# Patient Record
Sex: FEMALE | Race: BLACK OR AFRICAN AMERICAN | NOT HISPANIC OR LATINO | Employment: FULL TIME | URBAN - METROPOLITAN AREA
[De-identification: names, ages, dates, MRNs, and addresses within clinical notes are randomized per-mention and may not be internally consistent; named-entity substitution may affect disease eponyms.]

---

## 2017-01-10 ENCOUNTER — LAB VISIT (OUTPATIENT)
Dept: LAB | Facility: HOSPITAL | Age: 52
End: 2017-01-10
Attending: INTERNAL MEDICINE
Payer: COMMERCIAL

## 2017-01-10 DIAGNOSIS — M35.9 CONNECTIVE TISSUE DISEASE, UNDIFFERENTIATED: ICD-10-CM

## 2017-01-10 LAB
ALBUMIN SERPL BCP-MCNC: 3.9 G/DL
ALP SERPL-CCNC: 86 U/L
ALT SERPL W/O P-5'-P-CCNC: 20 U/L
ANION GAP SERPL CALC-SCNC: 9 MMOL/L
AST SERPL-CCNC: 25 U/L
BASOPHILS # BLD AUTO: 0.02 K/UL
BASOPHILS NFR BLD: 0.4 %
BILIRUB SERPL-MCNC: 0.4 MG/DL
BUN SERPL-MCNC: 24 MG/DL
CALCIUM SERPL-MCNC: 9.1 MG/DL
CHLORIDE SERPL-SCNC: 108 MMOL/L
CO2 SERPL-SCNC: 22 MMOL/L
CREAT SERPL-MCNC: 0.8 MG/DL
DIFFERENTIAL METHOD: NORMAL
EOSINOPHIL # BLD AUTO: 0.2 K/UL
EOSINOPHIL NFR BLD: 4.7 %
ERYTHROCYTE [DISTWIDTH] IN BLOOD BY AUTOMATED COUNT: 13.2 %
ERYTHROCYTE [SEDIMENTATION RATE] IN BLOOD BY WESTERGREN METHOD: 6 MM/HR
EST. GFR  (AFRICAN AMERICAN): >60 ML/MIN/1.73 M^2
EST. GFR  (NON AFRICAN AMERICAN): >60 ML/MIN/1.73 M^2
GLUCOSE SERPL-MCNC: 93 MG/DL
HCT VFR BLD AUTO: 39.3 %
HGB BLD-MCNC: 13.9 G/DL
LYMPHOCYTES # BLD AUTO: 1.8 K/UL
LYMPHOCYTES NFR BLD: 38.5 %
MCH RBC QN AUTO: 28.9 PG
MCHC RBC AUTO-ENTMCNC: 35.4 %
MCV RBC AUTO: 82 FL
MONOCYTES # BLD AUTO: 0.5 K/UL
MONOCYTES NFR BLD: 10.7 %
NEUTROPHILS # BLD AUTO: 2.1 K/UL
NEUTROPHILS NFR BLD: 45.7 %
PLATELET # BLD AUTO: 169 K/UL
PMV BLD AUTO: 10 FL
POTASSIUM SERPL-SCNC: 4.9 MMOL/L
PROT SERPL-MCNC: 7.5 G/DL
RBC # BLD AUTO: 4.81 M/UL
SODIUM SERPL-SCNC: 139 MMOL/L
WBC # BLD AUTO: 4.67 K/UL

## 2017-01-10 PROCEDURE — 86140 C-REACTIVE PROTEIN: CPT

## 2017-01-10 PROCEDURE — 85025 COMPLETE CBC W/AUTO DIFF WBC: CPT | Mod: PO

## 2017-01-10 PROCEDURE — 80053 COMPREHEN METABOLIC PANEL: CPT | Mod: PO

## 2017-01-10 PROCEDURE — 85651 RBC SED RATE NONAUTOMATED: CPT | Mod: PO

## 2017-01-10 PROCEDURE — 36415 COLL VENOUS BLD VENIPUNCTURE: CPT | Mod: PO

## 2017-01-11 LAB — CRP SERPL-MCNC: 8.5 MG/L

## 2017-01-27 ENCOUNTER — OFFICE VISIT (OUTPATIENT)
Dept: RHEUMATOLOGY | Facility: CLINIC | Age: 52
End: 2017-01-27
Payer: COMMERCIAL

## 2017-01-27 VITALS
SYSTOLIC BLOOD PRESSURE: 133 MMHG | BODY MASS INDEX: 40.81 KG/M2 | WEIGHT: 260.56 LBS | HEART RATE: 61 BPM | DIASTOLIC BLOOD PRESSURE: 87 MMHG

## 2017-01-27 DIAGNOSIS — E55.9 VITAMIN D DEFICIENCY: ICD-10-CM

## 2017-01-27 DIAGNOSIS — I73.00 RAYNAUD'S DISEASE WITHOUT GANGRENE: ICD-10-CM

## 2017-01-27 DIAGNOSIS — D84.9 IMMUNOCOMPROMISED: ICD-10-CM

## 2017-01-27 DIAGNOSIS — M35.00 SJOGREN'S SYNDROME: ICD-10-CM

## 2017-01-27 DIAGNOSIS — Z51.81 MEDICATION MONITORING ENCOUNTER: ICD-10-CM

## 2017-01-27 DIAGNOSIS — E66.01 OBESITY, MORBID, BMI 40.0-49.9: Chronic | ICD-10-CM

## 2017-01-27 DIAGNOSIS — M35.9 CONNECTIVE TISSUE DISEASE, UNDIFFERENTIATED: Primary | ICD-10-CM

## 2017-01-27 PROCEDURE — 1159F MED LIST DOCD IN RCRD: CPT | Mod: S$GLB,,, | Performed by: PHYSICIAN ASSISTANT

## 2017-01-27 PROCEDURE — 3079F DIAST BP 80-89 MM HG: CPT | Mod: S$GLB,,, | Performed by: PHYSICIAN ASSISTANT

## 2017-01-27 PROCEDURE — 3075F SYST BP GE 130 - 139MM HG: CPT | Mod: S$GLB,,, | Performed by: PHYSICIAN ASSISTANT

## 2017-01-27 PROCEDURE — 99214 OFFICE O/P EST MOD 30 MIN: CPT | Mod: S$GLB,,, | Performed by: PHYSICIAN ASSISTANT

## 2017-01-27 PROCEDURE — 99999 PR PBB SHADOW E&M-EST. PATIENT-LVL IV: CPT | Mod: PBBFAC,,, | Performed by: PHYSICIAN ASSISTANT

## 2017-01-27 NOTE — MR AVS SNAPSHOT
[facility]  [address] [place] [address]  Phone: [phone]  Fax: [phone]                  Candie Gamboa   2017 8:00 AM   Office Visit    Description:  Female : 1965   Provider:  Jacqueline Nash PA-C   Department:  [facility] - Rheumatology           Reason for Visit     UCTD     Osteoarthritis           Diagnoses this Visit        Comments    Connective tissue disease, undifferentiated    -  Primary     Sjogren's syndrome         Raynaud's disease without gangrene         Vitamin D deficiency         Medication monitoring encounter         Immunocompromised                To Do List           Future Appointments        Provider Department Dept Phone    2017 8:00 AM Jacqueline Nash PA-C [facility] [phone]    2017 9:00 AM Mike Olmstead MD [facility] [phone]      Goals (5 Years of Data)     None      Follow-Up and Disposition     Return in about 3 months (around 2017) for me reg 4,Vit D,  urinalysis, dvvt, B2 glycoproteins, cardiolipin antibodies .      [facility] On Call     [facility] On Call Nurse Care Line -  Assistance  Registered nurses in the [facility] On Call Center provide clinical advisement, health education, appointment booking, and other advisory services.  Call for this free service at [phone].             Medications           Message regarding Medications     Verify the changes and/or additions to your medication regime listed below are the same as discussed with your clinician today.  If any of these changes or additions are incorrect, please notify your healthcare provider.             Verify that the below list of medications is an accurate representation of the medications you are currently taking.  If none reported, the list may be blank. If incorrect, please contact your healthcare provider. Carry this list with you in case of emergency.           Current Medications     ascorbic acid (VITAMIN C)  1000 MG tablet Take 1,000 mg by mouth 2 (two) times daily.    cholecalciferol, vitamin D3, 50,000 unit capsule Take 1 capsule (50,000 Units total) by mouth every 7 days.    diltiazem (CARDIZEM) 120 MG tablet Take 2 tablets (240 mg total) by mouth once daily.    DOCOSAHEXANOIC ACID/EPA (FISH OIL ORAL) Take 2,000 mg by mouth 2 (two) times daily.    esomeprazole (NEXIUM) 40 MG capsule Take 1 capsule (40 mg total) by mouth before breakfast.    fluticasone (FLONASE) 50 mcg/actuation nasal spray 1 spray by Each Nare route once daily.    hydroxychloroquine (PLAQUENIL) 200 mg tablet Take 1 tablet (200 mg total) by mouth 2 (two) times daily.    metoprolol succinate (TOPROL-XL) 100 MG 24 hr tablet Take 1 tablet (100 mg total) by mouth once daily.    multivitamin (ONE DAILY MULTIVITAMIN) per tablet Take 1 tablet by mouth once daily.    prasterone, dhea, (DHEA) 50 mg Cap Take 50 mg by mouth 2 (two) times daily.    pravastatin (PRAVACHOL) 40 MG tablet Take 40 mg by mouth once daily.    predniSONE (DELTASONE) 5 MG tablet Take 4 tablets (20 mg total) by mouth once daily. Or as directed    pyridoxine (VITAMIN B-6) 200 mg Tab Take by mouth.    tramadol (ULTRAM) 50 mg tablet Take 1 tablet (50 mg total) by mouth every 6 (six) hours as needed.    vitamin E 400 UNIT capsule Take 400 Units by mouth once daily.    zolpidem (AMBIEN) 5 MG Tab Take 1 tablet (5 mg total) by mouth nightly as needed.    coenzyme Q10 (CO Q-10) 100 mg capsule Take 100 mg by mouth once daily.           Clinical Reference Information           Vital Signs - Last Recorded  Most recent update: 1/27/2017  8:12 AM by Kalie Harp LPN    BP Pulse Wt BMI       133/87 61 118.2 kg (260 lb 9.3 oz) 40.81 kg/m2       Blood Pressure          Most Recent Value    BP  133/87      Allergies as of 1/27/2017     No Known Allergies      Immunizations Administered on Date of Encounter - 1/27/2017     None      Orders Placed During Today's Visit     Future Labs/Procedures  Expected by Expires    CT Chest Without Contrast  1/27/2017 1/27/2018    Recurring Lab Work Interval Expires    Urinalysis  Every 12 Weeks until 3/28/2018 3/28/2018    Vitamin D   3/28/2018      Instructions    Cont plaquenil 2 x daily    We will get a CT of your chest     Mediterranean [email or web address]

## 2017-01-27 NOTE — PROGRESS NOTES
Subjective:       Patient ID: Candie Gamboa is a 51 y.o. female.    Chief Complaint: UCTD and Osteoarthritis    HPI Comments: Candie is here for follow-up today.  She is seen for undifferentiated connective tissue disease associated with Raynaud's, arthralgias, and sjogrens.  She has a h/o leukopenia but today is normal..  She does have Sjogren's syndrome with dry eyes and dry mouth but her antibodies are negative.  She takes over-the-counter eyedrops.  She has chronic pain in her knees that is more related to degenerative joint disease and chondromalacia.  She has tried glucosamine chondroitin without great results but does take Aleve for her knee pain which helps.  Candie takes Plaquenil 200 mg twice a day for her UCTD tolerates this well no issues with her eyes she sees an eye doctor yearly.      She has no complaints of mouth sores, hair loss, chest pain, shortness of breath.  No abdominal complaints, no hematuria, no weakness.  She does have a rash on her bilateral forearms which was present last visit, livedo rash.  Labs were ordered to rule out anti-cardiolipin antibody syndrome but these were never done.  Her raynauds is stable.  She has chronic reflux and takes Nexium 40 mg.  She is on 50,000 units vitamin D weekly for vitamin D deficiency however last vitamin D level was done in 2014.  Echo and pulmonary function tests were ordered last visit I had the echo was normal showing normal right ventricle and pulmonary artery pressures.  Her pulmonary function tests showed normal airflow, but some decreased diffusion capacity due to reduced lung volume.     Review of Systems   Constitutional: Negative for chills, fatigue and fever.   HENT: Negative for mouth sores, rhinorrhea and sore throat.         Dry mouth  No mouth sores   Eyes: Negative for pain and redness.        Dry eyes   Respiratory: Negative for cough and shortness of breath.    Cardiovascular: Negative for chest pain.    Gastrointestinal: Negative for abdominal pain, constipation, diarrhea, nausea and vomiting.        Reflux   Genitourinary: Negative for dysuria and hematuria.   Musculoskeletal: Positive for arthralgias. Negative for joint swelling and myalgias.   Skin: Negative for rash.        raynauds   Neurological: Negative for weakness, numbness and headaches.   Psychiatric/Behavioral: The patient is not nervous/anxious.          Objective:     Visit Vitals    /87    Pulse 61    Wt 118.2 kg (260 lb 9.3 oz)    BMI 40.81 kg/m2        Physical Exam   Constitutional: She is oriented to person, place, and time and well-developed, well-nourished, and in no distress.   HENT:   Head: Normocephalic and atraumatic.   Eyes: Pupils are equal, round, and reactive to light. Right eye exhibits no discharge.   Neck: Normal range of motion.   Cardiovascular: Normal rate, regular rhythm and normal heart sounds.  Exam reveals no friction rub.    Pulmonary/Chest: Effort normal and breath sounds normal. No respiratory distress.   Abdominal: Soft. She exhibits no distension. There is no tenderness.   Lymphadenopathy:     She has no cervical adenopathy.   Neurological: She is alert and oriented to person, place, and time.   Skin: Rash noted. No erythema.     Livedo rash bilateral arms, forearms,   No telangiectasias, no digital uclers   Psychiatric: Mood normal.   Musculoskeletal: Normal range of motion. She exhibits no edema, tenderness or deformity.   pola wrists, mcps, pips full rom no swelling or tenderness, no sclerodactyly  pola knees pos crepitus, no effusions, no warmth  Muscle strength good pola upper and lower extremities              Recent Results (from the past 672 hour(s))   CBC auto differential    Collection Time: 01/10/17  9:33 AM   Result Value Ref Range    WBC 4.67 3.90 - 12.70 K/uL    RBC 4.81 4.00 - 5.40 M/uL    Hemoglobin 13.9 12.0 - 16.0 g/dL    Hematocrit 39.3 37.0 - 48.5 %    MCV 82 82 - 98 fL    MCH 28.9 27.0 -  31.0 pg    MCHC 35.4 32.0 - 36.0 %    RDW 13.2 11.5 - 14.5 %    Platelets 169 150 - 350 K/uL    MPV 10.0 9.2 - 12.9 fL    Gran # 2.1 1.8 - 7.7 K/uL    Lymph # 1.8 1.0 - 4.8 K/uL    Mono # 0.5 0.3 - 1.0 K/uL    Eos # 0.2 0.0 - 0.5 K/uL    Baso # 0.02 0.00 - 0.20 K/uL    Gran% 45.7 38.0 - 73.0 %    Lymph% 38.5 18.0 - 48.0 %    Mono% 10.7 4.0 - 15.0 %    Eosinophil% 4.7 0.0 - 8.0 %    Basophil% 0.4 0.0 - 1.9 %    Differential Method Automated    Comprehensive metabolic panel    Collection Time: 01/10/17  9:33 AM   Result Value Ref Range    Sodium 139 136 - 145 mmol/L    Potassium 4.9 3.5 - 5.1 mmol/L    Chloride 108 95 - 110 mmol/L    CO2 22 (L) 23 - 29 mmol/L    Glucose 93 70 - 110 mg/dL    BUN, Bld 24 (H) 6 - 20 mg/dL    Creatinine 0.8 0.5 - 1.4 mg/dL    Calcium 9.1 8.7 - 10.5 mg/dL    Total Protein 7.5 6.0 - 8.4 g/dL    Albumin 3.9 3.5 - 5.2 g/dL    Total Bilirubin 0.4 0.1 - 1.0 mg/dL    Alkaline Phosphatase 86 55 - 135 U/L    AST 25 10 - 40 U/L    ALT 20 10 - 44 U/L    Anion Gap 9 8 - 16 mmol/L    eGFR if African American >60 >60 mL/min/1.73 m^2    eGFR if non African American >60 >60 mL/min/1.73 m^2   Sedimentation rate, manual    Collection Time: 01/10/17  9:33 AM   Result Value Ref Range    Sed Rate 6 0 - 20 mm/Hr   C-reactive protein    Collection Time: 01/10/17  9:35 AM   Result Value Ref Range    CRP 8.5 (H) 0.0 - 8.2 mg/L         Echo 10/19/16:CONCLUSIONS     1 - Concentric hypertrophy.     2 - Normal left ventricular systolic function (EF 55-60%).     3 - Normal left ventricular diastolic function.     4 - Normal right ventricular systolic function .     5 - The estimated PA systolic pressure is 25 mmHg.     PFTs:   Airflow is normal. Plethysmographic lung volumes reveal moderate restriction as  evidenced by reduced residual volume. Diffusion capacity is mildly reduced but corrects  for alveolar volume. Reduction in DLCO can be explained solely by reduced lung  volume (DLCO reduced and DL/Va normal)           Assessment:       1. Connective tissue disease, undifferentiated    2. Sjogren's syndrome    3. Raynaud's disease without gangrene    4. Vitamin D deficiency    5. Medication monitoring encounter    6. Immunocompromised    7. Obesity, morbid, BMI 40.0-49.9          UCTD: +hammad (1:320 centromere), watch for limited scleroderma, pt with marked reflux, no pulm symptoms at this time, echo normal, PfT with decreased DLCO due to reduced lung volume  Sjogrens: stable, neg antibodies, otc drops  Raynauds: stable, taking vit [id number], fish oil daily  Vit D def: last level 30, done in 2014, on vit D 50,000units weekly  Med monitoring: no toxicity, sees eye drCortney Yearly  Immunocompromised; received flu at pcp  Livedo rash: unclear why, r/o anticardiolipin antibody syndrome, labs not done last time no h/o clots or miscarriages  Obesity: bmi 40+  Plan:        1. Due to reduced lung volume on PFT, will get HRCT lung to r/o ILD  2. Will get cardiolipin antibody, Dvvt, and B2 glycoproteins done for next visit since these weren't done  3. Check vit D nevt visit  4. Cont plaquenil 200mg BID  5. Encouraged weight loss, exercise, med. Diet    rtc in 3 mos with me, reg 4, urine, and above labs, CT review  rtc in 6 mos with dr. Olmstead    Call for questions/concerns    Reviewed, ANGEL OLMSTEAD MD

## 2017-01-27 NOTE — PATIENT INSTRUCTIONS
Cont plaquenil 2 x daily    We will get a CT of your chest     Mediterranean [email or web address]

## 2017-08-08 ENCOUNTER — TELEPHONE (OUTPATIENT)
Dept: RHEUMATOLOGY | Facility: CLINIC | Age: 52
End: 2017-08-08

## 2017-08-08 NOTE — TELEPHONE ENCOUNTER
----- Message from Charleen Kendrick sent at 8/8/2017  3:02 PM CDT -----  Contact: Pt  Pt is requesting to speak to the nurse regarding her appt that's scheduled on tomorrow. Pls call pt back at [phone].

## 2017-08-10 DIAGNOSIS — M35.9 CONNECTIVE TISSUE DISEASE, UNDIFFERENTIATED: ICD-10-CM

## 2017-08-10 RX ORDER — ASPIRIN 325 MG
50000 TABLET, DELAYED RELEASE (ENTERIC COATED) ORAL
Qty: 12 CAPSULE | Refills: 3 | Status: SHIPPED | OUTPATIENT
Start: 2017-08-10 | End: 2023-01-06

## 2017-08-10 NOTE — TELEPHONE ENCOUNTER
----- Message from Lexie Hallman sent at 8/9/2017  4:15 PM CDT -----  Would like to speak to nurse. Please call back at [phone]. thanks

## 2017-08-10 NOTE — TELEPHONE ENCOUNTER
----- Message from Mary Kay Whitfield sent at 8/10/2017 10:12 AM CDT -----  Contact: pt  The pt states she is returning a missed call, pt can be reached at [phone]///[id number]

## 2017-08-10 NOTE — TELEPHONE ENCOUNTER
Spoke with Ms. Gamboa and scheduled a follow up visit with Jacqueline Nash PA-C for 08/16/2017. Patient states the reason she could not be seen at previous scheduled date was due to her insurance. Patient is requesting a refill of her Vitamin D3 to be sent to [facility] in [place].

## 2017-08-16 ENCOUNTER — TELEPHONE (OUTPATIENT)
Dept: RHEUMATOLOGY | Facility: CLINIC | Age: 52
End: 2017-08-16

## 2017-08-16 NOTE — TELEPHONE ENCOUNTER
----- Message from Ban Senior sent at 8/15/2017  4:22 PM CDT -----  Contact: pt  States she would like to speak to Caio, she would like to speak with you. She wouldn't say what it was regarding. Please call pt at [phone]. Thank you

## 2017-10-10 DIAGNOSIS — M35.9 CONNECTIVE TISSUE DISEASE, UNDIFFERENTIATED: ICD-10-CM

## 2017-10-10 RX ORDER — HYDROXYCHLOROQUINE SULFATE 200 MG/1
TABLET, FILM COATED ORAL
Qty: 180 TABLET | Refills: 1 | Status: SHIPPED | OUTPATIENT
Start: 2017-10-10 | End: 2023-01-06

## 2017-10-10 RX ORDER — ESOMEPRAZOLE MAGNESIUM 40 MG/1
CAPSULE, DELAYED RELEASE ORAL
Qty: 90 CAPSULE | Refills: 1 | Status: SHIPPED | OUTPATIENT
Start: 2017-10-10 | End: 2018-03-22 | Stop reason: SDUPTHER

## 2018-03-22 DIAGNOSIS — M35.9 CONNECTIVE TISSUE DISEASE, UNDIFFERENTIATED: ICD-10-CM

## 2018-03-26 RX ORDER — ESOMEPRAZOLE MAGNESIUM 40 MG/1
CAPSULE, DELAYED RELEASE ORAL
Qty: 90 CAPSULE | Refills: 1 | Status: SHIPPED | OUTPATIENT
Start: 2018-03-26 | End: 2018-09-29 | Stop reason: SDUPTHER

## 2018-06-01 DIAGNOSIS — M35.9 CONNECTIVE TISSUE DISEASE, UNDIFFERENTIATED: ICD-10-CM

## 2018-06-08 RX ORDER — HYDROXYCHLOROQUINE SULFATE 200 MG/1
TABLET, FILM COATED ORAL
Qty: 180 TABLET | Refills: 1 | OUTPATIENT
Start: 2018-06-08

## 2018-09-29 DIAGNOSIS — M35.9 CONNECTIVE TISSUE DISEASE, UNDIFFERENTIATED: ICD-10-CM

## 2018-10-01 RX ORDER — ESOMEPRAZOLE MAGNESIUM 40 MG/1
CAPSULE, DELAYED RELEASE ORAL
Qty: 90 CAPSULE | Refills: 1 | Status: SHIPPED | OUTPATIENT
Start: 2018-10-01

## 2019-04-19 DIAGNOSIS — M35.9 CONNECTIVE TISSUE DISEASE, UNDIFFERENTIATED: ICD-10-CM

## 2019-04-21 RX ORDER — ESOMEPRAZOLE MAGNESIUM 40 MG/1
CAPSULE, DELAYED RELEASE ORAL
Qty: 90 CAPSULE | Refills: 1 | OUTPATIENT
Start: 2019-04-21

## 2019-04-26 DIAGNOSIS — M35.9 CONNECTIVE TISSUE DISEASE, UNDIFFERENTIATED: ICD-10-CM

## 2019-04-29 RX ORDER — ESOMEPRAZOLE MAGNESIUM 40 MG/1
CAPSULE, DELAYED RELEASE ORAL
Qty: 90 CAPSULE | Refills: 1 | OUTPATIENT
Start: 2019-04-29

## 2019-08-01 ENCOUNTER — TELEPHONE (OUTPATIENT)
Dept: PHARMACY | Facility: CLINIC | Age: 54
End: 2019-08-01

## 2019-08-01 NOTE — TELEPHONE ENCOUNTER
LVM for callback to inform patient that [facility] received prescription for Revatio and benefits investigation is required.  [facility] will be back in touch once insurance determination is received.

## 2019-08-09 NOTE — TELEPHONE ENCOUNTER
Initial Revatio 20mg consult completed on 19 over the phone. Revatio 20mg will be shipped on 19 to arrive at patient's home on 19 via FedEx. $0 copay. Patient will start Revatio 20mg on 19. Address confirmed, CC on file. Confirmed 2 patient identifiers - name and . Therapy Appropriate.    Revatio 20mg - Take 1 tablet by mouth TID with or without food.  Counseling was reviewed:  1. Patient MUST take Revatio at the SAME time every day.  2. Side effects include diarrhea, flushing, headache, belly pain or heartburn, upset stomach, stuffy nose, runny nose, muscle pain.   Tell your doctor or get medical help if: signs of allergic reaction, chest pain or pressure or fast heartbeat, dizziness/fainting, very bad headache, goughing up blood, very upset stomach or throwing up, weakness on 1 side of the body, changes in eyesight or hearing, ringing in the ears, memory loss, seizures, shortness of breath, swelling of hands or feet.     DDI: Medication list reviewed and potential DDIs addressed. No DDIs or allergies noted. Patient MUST contact myself or provider prior to starting any new OTC, herbal, or prescription drugs to avoid potential DDIs.    Storage:   - Tablets: Room temperature    Discussed the importance of staying well hydrated while on therapy. Compliance stressed - patient to take missed doses as soon as remembered, but NOT to take 2 doses at once or double dose. Patient will report questions or concerns to myself or practitioner. Patient verbalizes understanding.  Consultation included: indication; goals of treatment; administration; storage and handling; side effects; how to handle side effects; the importance of compliance; how to handle missed doses; the importance of laboratory monitoring; the importance of keeping all follow up appointments.  Patient understands to report any medication changes to [facility] and provider. All questions answered and addressed to patients satisfaction.  I will f/u  with patient in 1 week from start, and [facility] will contact patient in 3 weeks to coordinate next refill.

## 2019-08-23 NOTE — TELEPHONE ENCOUNTER
Revatio 7 day post call. Confirmed two patient identifiers - name + . Patient confirms start date of 19. She denies any side effects and confirms that she feels fine. She takes Revatio 20mg TID at 6am,2pm, and 10pm. NO further questions or concerns. [facility] to f/u monthly for refills. TTN

## 2019-09-05 ENCOUNTER — TELEPHONE (OUTPATIENT)
Dept: PHARMACY | Facility: CLINIC | Age: 54
End: 2019-09-05

## 2019-10-11 ENCOUNTER — TELEPHONE (OUTPATIENT)
Dept: PHARMACY | Facility: CLINIC | Age: 54
End: 2019-10-11

## 2019-10-17 NOTE — TELEPHONE ENCOUNTER
FOR DOCUMENTATION ONLY:    Financial Assistance for Revatio approved with a Co-Pay Card from 1/28/2019 - 12/31/2020    Source: LIU Kuldip    ID: [id number]  BIN: [id number]  PCN: [id number]  GRP: [id number]    Max Amount: $No Limit

## 2019-11-07 ENCOUNTER — TELEPHONE (OUTPATIENT)
Dept: PHARMACY | Facility: CLINIC | Age: 54
End: 2019-11-07

## 2019-11-11 NOTE — TELEPHONE ENCOUNTER
Sildenafil follow up. Confirmed two patient identifiers - name + . Goals of treatment reviewed - no changes.  Treatment continuation appropriate. Medication Reconciliation completed - no changes, no DDIs. Patient denies any side effects, visits to the ER/urgent care and missed days from school, work, or planned activities due to medical condition. Patient denies any missed doses and confirms proper administration (po TID, further injection training declined) and storage (room temperature). Comorbidities and contraindications reviewed - no changes. Patient denies any recent changes to allergies, health conditions, or insurance. All questions answered to patients satisfaction. [facility] to continue to follow up with patient in 6 months and monthly for refills. She says she has about 7 days of treatment remaining, but RX is out of refills - msg sent to provider for refills. [facility] will call and confirm shipment when new Rx/refills  received. ROB

## 2019-12-13 ENCOUNTER — TELEPHONE (OUTPATIENT)
Dept: PHARMACY | Facility: CLINIC | Age: 54
End: 2019-12-13

## 2020-01-15 ENCOUNTER — TELEPHONE (OUTPATIENT)
Dept: PHARMACY | Facility: CLINIC | Age: 55
End: 2020-01-15

## 2020-01-20 NOTE — TELEPHONE ENCOUNTER
"Patient called to refill Sildenafil.  Patient states she has enough for "a couple of days".  Ship 1/21/20 for 1/22/20 delivery.  Verified address.  Copay $0 in 004.  Patient confirmed no new medications, health conditions, or allergies.  Declined [facility] .   "

## 2020-02-19 ENCOUNTER — TELEPHONE (OUTPATIENT)
Dept: PHARMACY | Facility: CLINIC | Age: 55
End: 2020-02-19

## 2020-02-19 NOTE — TELEPHONE ENCOUNTER
Refill call regarding Sildenafil at [facility]. Will prepare for shipment with consent of patient on  to arrive . Copay 0.00. Patient has not started any new medications including OTC drugs. Patient has not had any medication/ dose or instruction changes. No new allergies or side effects reported with this shipment. Medication is being taken as prescribed by physician and properly stored. Two patient identifiers:  and Address verified. Patient has 1 day on hand.

## 2020-03-13 ENCOUNTER — TELEPHONE (OUTPATIENT)
Dept: PHARMACY | Facility: CLINIC | Age: 55
End: 2020-03-13

## 2020-04-16 ENCOUNTER — TELEPHONE (OUTPATIENT)
Dept: PHARMACY | Facility: CLINIC | Age: 55
End: 2020-04-16

## 2020-05-18 ENCOUNTER — TELEPHONE (OUTPATIENT)
Dept: PHARMACY | Facility: CLINIC | Age: 55
End: 2020-05-18

## 2020-05-18 NOTE — TELEPHONE ENCOUNTER
Sildenafil refill and f/u attempted. No answer. LVM for call back. Will f/u with patient if no return call.   - Rx is out of refills. Refaxed request to provider. Also called office to check on status of request submitted on 5/15/20, but had to LVM for call back

## 2020-05-21 NOTE — TELEPHONE ENCOUNTER
Sildenafil follow up and refill. Confirmed two patient identifiers - name + . Goals of treatment reviewed - no changes. Labs reviewed - no new follow up. MD just approved refills. Treatment continuation appropriate. Medication Reconciliation completed - no changes, no DDIs. Patient denies any side effects, visits to the ER/urgent care and missed days from school, work, or planned activities due to medical condition. Patient denies any missed doses and confirms proper administration (1 tab po TID) and storage (room temperature). Comorbidities and contraindications reviewed - no changes. Patient denies any recent changes to allergies, health conditions, or insurance. Patient has ~4 days doses remaining to get through 20. [facility] to ship out Sildenafil on 20 to arrive at patients home on 20 via FedEx. Address confirmed (Special instructions to FedEx: Rx address). $0 copay ([employer]).  Supplies needed: none. All questions answered to patients satisfaction. [facility] to continue to follow up with patient in 12 months and monthly for refills. ROB

## 2020-06-15 ENCOUNTER — TELEPHONE (OUTPATIENT)
Dept: PHARMACY | Facility: CLINIC | Age: 55
End: 2020-06-15

## 2020-06-19 NOTE — TELEPHONE ENCOUNTER
Attempted refill call #1 for Revatio. Copay $0. No answer, lvm will follow up with patient if no return call. swiftQueue message sent. Copay $0 at 004.

## 2020-07-17 ENCOUNTER — TELEPHONE (OUTPATIENT)
Dept: PHARMACY | Facility: CLINIC | Age: 55
End: 2020-07-17

## 2020-08-10 ENCOUNTER — HISTORICAL (OUTPATIENT)
Dept: ADMINISTRATIVE | Facility: HOSPITAL | Age: 55
End: 2020-08-10

## 2020-08-12 LAB — FINAL CULTURE: NORMAL

## 2020-08-22 ENCOUNTER — TELEPHONE (OUTPATIENT)
Dept: PHARMACY | Facility: CLINIC | Age: 55
End: 2020-08-22

## 2020-09-16 ENCOUNTER — TELEPHONE (OUTPATIENT)
Dept: PHARMACY | Facility: CLINIC | Age: 55
End: 2020-09-16

## 2020-10-28 ENCOUNTER — SPECIALTY PHARMACY (OUTPATIENT)
Dept: PHARMACY | Facility: CLINIC | Age: 55
End: 2020-10-28

## 2020-10-28 DIAGNOSIS — I27.21 PULMONARY ARTERIAL HYPERTENSION: ICD-10-CM

## 2020-10-28 NOTE — TELEPHONE ENCOUNTER
Specialty Pharmacy - Refill Coordination    Specialty Medication Orders Linked to Encounter      Most Recent Value   Medication #1  sildenafil (REVATIO) 20 mg Tab (Order#[id number], Rx#[id number])          Refill Questions - Documented Responses      Most Recent Value   Relationship to patient of person spoken to?  Self   HIPAA/medical authority confirmed?  Yes   Any changes in contact preferences or allowed representatives?  No   Has the patient had any insurance changes?  No   Has the patient had any changes to specialty medication, dose, or instructions?  No   Has the patient started taking any new medications, herbals, or supplements?  No   Has the patient been diagnosed with any new medical conditions?  No   Does the patient have any new allergies to medications or foods?  No   Does the patient have any concerns about side effects?  No   Can the patient store medication/sharps container properly (at the correct temperature, away from children/pets, etc.)?  Yes   Can the patient call emergency services (951) in the event of an emergency?  Yes   Does the patient have any concerns or questions about taking or administering this medication as prescribed?  No   How many doses did the patient miss in the past 4 weeks or since the last fill?  0   How many days does the patient report on hand quantity will last?  9   Does the number of doses/days supply remaining match pharmacy expected amounts?  Yes   How will the patient receive the medication?  Mail   When does the patient need to receive the medication?  11/05/20   Shipping Address  Home   Address in [facility] confirmed and updated if neccessary?  Yes   Expected Copay ($)  0   Is the patient able to afford the medication copay?  Yes   Payment Method  zero copay   Days supply of Refill  30   Would patient like to speak to a pharmacist?  No   Do you want to trigger an intervention?  No   Do you want to trigger an additional referral task?  No   Refill  activity completed?  Yes   Refill activity plan  Refill scheduled   Shipment/Pickup Date:  11/02/20          Current Outpatient Medications   Medication Sig    ascorbic acid (VITAMIN C) 1000 MG tablet Take 1,000 mg by mouth 2 (two) times daily.    cholecalciferol, vitamin D3, 50,000 unit capsule Take 1 capsule (50,000 Units total) by mouth every 7 days.    coenzyme Q10 (CO Q-10) 100 mg capsule Take 100 mg by mouth once daily.    diltiazem (CARDIZEM) 120 MG tablet Take 2 tablets (240 mg total) by mouth once daily.    DOCOSAHEXANOIC ACID/EPA (FISH OIL ORAL) Take 2,000 mg by mouth 2 (two) times daily.    esomeprazole (NEXIUM) 40 MG capsule TAKE ONE CAPSULE BY MOUTH BEFORE BREAKFAST    fluticasone (FLONASE) 50 mcg/actuation nasal spray 1 spray by Each Nare route once daily.    hydroxychloroquine (PLAQUENIL) 200 mg tablet TAKE 1 TABLET BY MOUTH TWICE A DAY    metoprolol succinate (TOPROL-XL) 100 MG 24 hr tablet Take 1 tablet (100 mg total) by mouth once daily.    multivitamin (ONE DAILY MULTIVITAMIN) per tablet Take 1 tablet by mouth once daily.    prasterone, dhea, (DHEA) 50 mg Cap Take 50 mg by mouth 2 (two) times daily.    pravastatin (PRAVACHOL) 40 MG tablet Take 40 mg by mouth once daily.    predniSONE (DELTASONE) 5 MG tablet Take 4 tablets (20 mg total) by mouth once daily. Or as directed    pyridoxine (VITAMIN B-6) 200 mg Tab Take by mouth.    sildenafil (REVATIO) 20 mg Tab Take 1 tablet by mouth three times daily.    tramadol (ULTRAM) 50 mg tablet Take 1 tablet (50 mg total) by mouth every 6 (six) hours as needed.    vitamin E 400 UNIT capsule Take 400 Units by mouth once daily.    zolpidem (AMBIEN) 5 MG Tab Take 1 tablet (5 mg total) by mouth nightly as needed.   Last reviewed on 10/28/2020  9:51 AM by Ceci Galvan PharmD    Review of patient's allergies indicates:  No Known Allergies Last reviewed on  10/28/2020 9:51 AM by Ceci Galvan      Tasks added this encounter   11/25/2020 - Refill  Call (Auto Added)   Tasks due within next 3 months   No tasks due.     Ceci Galvan, PharmD  [facility]  [address] [address]  Phone: [phone]  Fax: [phone]

## 2020-12-30 ENCOUNTER — SPECIALTY PHARMACY (OUTPATIENT)
Dept: PHARMACY | Facility: CLINIC | Age: 55
End: 2020-12-30

## 2020-12-30 NOTE — TELEPHONE ENCOUNTER
Specialty Pharmacy - Refill Coordination    Specialty Medication Orders Linked to Encounter      Most Recent Value   Medication #1  sildenafil (REVATIO) 20 mg Tab (Order#[id number], Rx#[id number])          Refill Questions - Documented Responses      Most Recent Value   Relationship to patient of person spoken to?  Self   HIPAA/medical authority confirmed?  Yes   Any changes in contact preferences or allowed representatives?  No   Has the patient had any insurance changes?  No   Has the patient had any changes to specialty medication, dose, or instructions?  No   Has the patient started taking any new medications, herbals, or supplements?  No   Has the patient been diagnosed with any new medical conditions?  No   Does the patient have any new allergies to medications or foods?  No   Does the patient have any concerns about side effects?  No   Can the patient store medication/sharps container properly (at the correct temperature, away from children/pets, etc.)?  Yes   Can the patient call emergency services (911) in the event of an emergency?  Yes   Does the patient have any concerns or questions about taking or administering this medication as prescribed?  No   How many doses did the patient miss in the past 4 weeks or since the last fill?  0   How many doses does the patient have on hand?  21   How many days does the patient report on hand quantity will last?  7   Does the number of doses/days supply remaining match pharmacy expected amounts?  Yes   Does the patient feel that this medication is effective?  Yes   During the past 4 weeks, has patient missed any activities due to condition or medication?  No   During the past 4 weeks, did patient have any of the following urgent care visits?  None   How will the patient receive the medication?  Mail   When does the patient need to receive the medication?  01/04/21   Shipping Address  Home   Address in [facility] confirmed and updated if neccessary?  Yes    Expected Copay ($)  0   Is the patient able to afford the medication copay?  Yes   Payment Method  zero copay   Days supply of Refill  30   Would patient like to speak to a pharmacist?  No   Do you want to trigger an intervention?  No   Do you want to trigger an additional referral task?  No   Refill activity completed?  Yes   Refill activity plan  Refill scheduled   Shipment/Pickup Date:  01/04/21          Current Outpatient Medications   Medication Sig    ascorbic acid (VITAMIN C) 1000 MG tablet Take 1,000 mg by mouth 2 (two) times daily.    cholecalciferol, vitamin D3, 50,000 unit capsule Take 1 capsule (50,000 Units total) by mouth every 7 days.    coenzyme Q10 (CO Q-10) 100 mg capsule Take 100 mg by mouth once daily.    diltiazem (CARDIZEM) 120 MG tablet Take 2 tablets (240 mg total) by mouth once daily.    DOCOSAHEXANOIC ACID/EPA (FISH OIL ORAL) Take 2,000 mg by mouth 2 (two) times daily.    esomeprazole (NEXIUM) 40 MG capsule TAKE ONE CAPSULE BY MOUTH BEFORE BREAKFAST    fluticasone (FLONASE) 50 mcg/actuation nasal spray 1 spray by Each Nare route once daily.    hydroxychloroquine (PLAQUENIL) 200 mg tablet TAKE 1 TABLET BY MOUTH TWICE A DAY    metoprolol succinate (TOPROL-XL) 100 MG 24 hr tablet Take 1 tablet (100 mg total) by mouth once daily.    multivitamin (ONE DAILY MULTIVITAMIN) per tablet Take 1 tablet by mouth once daily.    prasterone, dhea, (DHEA) 50 mg Cap Take 50 mg by mouth 2 (two) times daily.    pravastatin (PRAVACHOL) 40 MG tablet Take 40 mg by mouth once daily.    predniSONE (DELTASONE) 5 MG tablet Take 4 tablets (20 mg total) by mouth once daily. Or as directed    pyridoxine (VITAMIN B-6) 200 mg Tab Take by mouth.    sildenafil (REVATIO) 20 mg Tab Take 1 tablet by mouth three times daily.    sildenafil (REVATIO) 20 mg Tab Take 1 tablet (20 mg total) by mouth 3 (three) times daily.    tramadol (ULTRAM) 50 mg tablet Take 1 tablet (50 mg total) by mouth every 6 (six) hours as  needed.    vitamin E 400 UNIT capsule Take 400 Units by mouth once daily.    zolpidem (AMBIEN) 5 MG Tab Take 1 tablet (5 mg total) by mouth nightly as needed.   Last reviewed on 12/2/2020  1:33 PM by Sohail Schultz    Review of patient's allergies indicates:  No Known Allergies Last reviewed on  12/2/2020 1:33 PM by Sohail Schultz      Tasks added this encounter   1/27/2021 - Refill Call (Auto Added)   Tasks due within next 3 months   No tasks due.     Quoc Infante, PharmD  [facility]  [address] [place] [id number]  Phone: [phone]  Fax: [phone]

## 2021-01-27 ENCOUNTER — PATIENT MESSAGE (OUTPATIENT)
Dept: PHARMACY | Facility: CLINIC | Age: 56
End: 2021-01-27

## 2021-01-27 ENCOUNTER — SPECIALTY PHARMACY (OUTPATIENT)
Dept: PHARMACY | Facility: CLINIC | Age: 56
End: 2021-01-27

## 2021-01-28 ENCOUNTER — SPECIALTY PHARMACY (OUTPATIENT)
Dept: PHARMACY | Facility: CLINIC | Age: 56
End: 2021-01-28

## 2021-02-03 ENCOUNTER — HISTORICAL (OUTPATIENT)
Dept: ADMINISTRATIVE | Facility: HOSPITAL | Age: 56
End: 2021-02-03

## 2021-02-04 LAB — FINAL CULTURE: NORMAL

## 2021-02-25 ENCOUNTER — PATIENT MESSAGE (OUTPATIENT)
Dept: PHARMACY | Facility: CLINIC | Age: 56
End: 2021-02-25

## 2021-03-04 ENCOUNTER — SPECIALTY PHARMACY (OUTPATIENT)
Dept: PHARMACY | Facility: CLINIC | Age: 56
End: 2021-03-04

## 2021-03-30 ENCOUNTER — PATIENT MESSAGE (OUTPATIENT)
Dept: PHARMACY | Facility: CLINIC | Age: 56
End: 2021-03-30

## 2021-04-06 ENCOUNTER — SPECIALTY PHARMACY (OUTPATIENT)
Dept: PHARMACY | Facility: CLINIC | Age: 56
End: 2021-04-06

## 2021-04-29 ENCOUNTER — PATIENT MESSAGE (OUTPATIENT)
Dept: PHARMACY | Facility: CLINIC | Age: 56
End: 2021-04-29

## 2021-05-07 ENCOUNTER — SPECIALTY PHARMACY (OUTPATIENT)
Dept: PHARMACY | Facility: CLINIC | Age: 56
End: 2021-05-07

## 2021-05-12 ENCOUNTER — PATIENT MESSAGE (OUTPATIENT)
Dept: RESEARCH | Facility: HOSPITAL | Age: 56
End: 2021-05-12

## 2021-06-09 ENCOUNTER — SPECIALTY PHARMACY (OUTPATIENT)
Dept: PHARMACY | Facility: CLINIC | Age: 56
End: 2021-06-09

## 2021-07-27 ENCOUNTER — HISTORICAL (OUTPATIENT)
Dept: ADMINISTRATIVE | Facility: HOSPITAL | Age: 56
End: 2021-07-27

## 2021-07-29 LAB — FINAL CULTURE: NORMAL

## 2021-08-06 ENCOUNTER — PATIENT MESSAGE (OUTPATIENT)
Dept: PHARMACY | Facility: CLINIC | Age: 56
End: 2021-08-06

## 2021-08-18 ENCOUNTER — SPECIALTY PHARMACY (OUTPATIENT)
Dept: PHARMACY | Facility: CLINIC | Age: 56
End: 2021-08-18

## 2021-09-17 ENCOUNTER — PATIENT MESSAGE (OUTPATIENT)
Dept: PHARMACY | Facility: CLINIC | Age: 56
End: 2021-09-17

## 2021-09-22 ENCOUNTER — SPECIALTY PHARMACY (OUTPATIENT)
Dept: PHARMACY | Facility: CLINIC | Age: 56
End: 2021-09-22

## 2021-10-21 ENCOUNTER — PATIENT MESSAGE (OUTPATIENT)
Dept: PHARMACY | Facility: CLINIC | Age: 56
End: 2021-10-21
Payer: COMMERCIAL

## 2021-10-26 ENCOUNTER — SPECIALTY PHARMACY (OUTPATIENT)
Dept: PHARMACY | Facility: CLINIC | Age: 56
End: 2021-10-26
Payer: COMMERCIAL

## 2021-11-22 ENCOUNTER — PATIENT MESSAGE (OUTPATIENT)
Dept: PHARMACY | Facility: CLINIC | Age: 56
End: 2021-11-22
Payer: COMMERCIAL

## 2021-11-22 LAB
PAP RECOMMENDATION EXT: NORMAL
PAP SMEAR: NORMAL

## 2021-11-26 ENCOUNTER — SPECIALTY PHARMACY (OUTPATIENT)
Dept: PHARMACY | Facility: CLINIC | Age: 56
End: 2021-11-26
Payer: COMMERCIAL

## 2021-12-29 ENCOUNTER — SPECIALTY PHARMACY (OUTPATIENT)
Dept: PHARMACY | Facility: CLINIC | Age: 56
End: 2021-12-29
Payer: COMMERCIAL

## 2022-01-28 ENCOUNTER — SPECIALTY PHARMACY (OUTPATIENT)
Dept: PHARMACY | Facility: CLINIC | Age: 57
End: 2022-01-28
Payer: COMMERCIAL

## 2022-01-28 NOTE — TELEPHONE ENCOUNTER
Sildenafil refill attempted. Co-pay $386.91. Patient has about 10-days on hand. No COB with hien last year. Will f/u with patient to inquire if she has a hien through her employer.

## 2022-02-02 ENCOUNTER — PATIENT MESSAGE (OUTPATIENT)
Dept: PHARMACY | Facility: CLINIC | Age: 57
End: 2022-02-02
Payer: COMMERCIAL

## 2022-02-04 NOTE — TELEPHONE ENCOUNTER
Outgoing call to Ms. Gamboa about sildenafil co-pay. No answer. M for call back.   - Will f/u with patient to inquire if she has a hien through her employer.

## 2022-02-04 NOTE — TELEPHONE ENCOUNTER
Patient returned call. She states that she has not followed up in regards to her hien. Patient will assess hien status and call [facility] back.     Quoc Infante, PharmD  Clinical Pharmacist  [facility]  P: [phone]

## 2022-02-07 NOTE — TELEPHONE ENCOUNTER
Incoming call from patient. She didn't not have a hien or any other assistance last year. Will pursue a PAH hien for sildenafil. HH and income obtained.

## 2022-02-09 NOTE — TELEPHONE ENCOUNTER
Financial assistance update: There are no PAH grants open at this time.     Will complete full benefits investigation for 2022 [employer] plan

## 2022-02-11 NOTE — TELEPHONE ENCOUNTER
BI-sildenafil   [employer]-rep [employer]     Deductible- $300 (met)  [employer]OP[employer]- $5,000 (met) rx + medical  Copay-$0-met [employer]- (Tier 1-25% of cost)    BI Complete-FA not required

## 2022-02-11 NOTE — TELEPHONE ENCOUNTER
Specialty Pharmacy - Medication/Referral Authorization  Specialty Pharmacy - Refill Coordination    Specialty Medication Orders Linked to Encounter    Flowsheet Row Most Recent Value   Medication #1 sildenafil (REVATIO) 20 mg Tab (Order#[id number], Rx#[id number])        Patient has 1 dose for 2/11.Will possibly miss dose but patient is checking with her pharmacy to possibly get med there but would like to set up refill schedule for Monday to be there for Tuesday 2/15 and call us if any update.    Refill Questions - Documented Responses    Flowsheet Row Most Recent Value   Patient Availability and HIPAA Verification    Does patient want to proceed with activity? Yes   HIPAA/medical authority confirmed? Yes   Relationship to patient of person spoken to? Self   Refill Screening Questions    Changes to allergies? No   Changes to medications? No   New conditions since last clinic visit? No   Unplanned office visit, urgent care, ED, or hospital admission in the last 4 weeks? No   How does patient/caregiver feel medication is working? Good   Financial problems or insurance changes? No   How many doses of your specialty medications were missed in the last 4 weeks? 0   Would patient like to speak to a pharmacist? No   When does the patient need to receive the medication? 02/12/22   Refill Delivery Questions    How will the patient receive the medication? Mail   When does the patient need to receive the medication? 02/12/22   Shipping Address Prescription   Address in [facility] confirmed and updated if neccessary? Yes   Expected Copay ($) 0   Is the patient able to afford the medication copay? Yes   Payment Method zero copay   Days supply of Refill 30   Supplies needed? No supplies needed   Refill activity completed? Yes   Refill activity plan Refill scheduled   Shipment/Pickup Date: 02/14/22          Current Outpatient Medications   Medication Sig    ascorbic acid (VITAMIN C) 1000 MG tablet Take 1,000 mg by mouth 2  (two) times daily.    cholecalciferol, vitamin D3, 50,000 unit capsule Take 1 capsule (50,000 Units total) by mouth every 7 days.    coenzyme Q10 (CO Q-10) 100 mg capsule Take 100 mg by mouth once daily.    diltiazem (CARDIZEM) 120 MG tablet Take 2 tablets (240 mg total) by mouth once daily.    DOCOSAHEXANOIC ACID/EPA (FISH OIL ORAL) Take 2,000 mg by mouth 2 (two) times daily.    esomeprazole (NEXIUM) 40 MG capsule TAKE ONE CAPSULE BY MOUTH BEFORE BREAKFAST    fluticasone (FLONASE) 50 mcg/actuation nasal spray 1 spray by Each Nare route once daily.    hydroxychloroquine (PLAQUENIL) 200 mg tablet TAKE 1 TABLET BY MOUTH TWICE A DAY    metoprolol succinate (TOPROL-XL) 100 MG 24 hr tablet Take 1 tablet (100 mg total) by mouth once daily.    multivitamin (ONE DAILY MULTIVITAMIN) per tablet Take 1 tablet by mouth once daily.    prasterone, dhea, (DHEA) 50 mg Cap Take 50 mg by mouth 2 (two) times daily.    pravastatin (PRAVACHOL) 40 MG tablet Take 40 mg by mouth once daily.    predniSONE (DELTASONE) 5 MG tablet Take 4 tablets (20 mg total) by mouth once daily. Or as directed    pyridoxine (VITAMIN B-6) 200 mg Tab Take by mouth.    sildenafil (REVATIO) 20 mg Tab Take 1 tablet by mouth three times daily.    tramadol (ULTRAM) 50 mg tablet Take 1 tablet (50 mg total) by mouth every 6 (six) hours as needed.    vitamin E 400 UNIT capsule Take 400 Units by mouth once daily.    zolpidem (AMBIEN) 5 MG Tab Take 1 tablet (5 mg total) by mouth nightly as needed.   Last reviewed on 3/4/2021  8:57 AM by Peter Manrique    Review of patient's allergies indicates:  No Known Allergies Last reviewed on  3/4/2021 8:57 AM by Kuldeep Manrique      Tasks added this encounter   1/28/2022 - Referral Authorization   Tasks due within next 3 months   1/28/2022 - Refill Call (Auto Added)     Tejinder Osorio, PharmD  [facility]  [address] [place] [id number]  Phone: [phone]  Fax: [phone]

## 2022-03-18 ENCOUNTER — SPECIALTY PHARMACY (OUTPATIENT)
Dept: PHARMACY | Facility: CLINIC | Age: 57
End: 2022-03-18
Payer: COMMERCIAL

## 2022-03-18 NOTE — TELEPHONE ENCOUNTER
Specialty Pharmacy - Refill Coordination    Specialty Medication Orders Linked to Encounter    Flowsheet Row Most Recent Value   Medication #1 sildenafil (REVATIO) 20 mg Tab (Order#[id number], Rx#[id number])        Refill Questions - Documented Responses    Flowsheet Row Most Recent Value   Patient Availability and HIPAA Verification    Does patient want to proceed with activity? Yes   HIPAA/medical authority confirmed? Yes   Relationship to patient of person spoken to? Self   Refill Screening Questions    Changes to allergies? No   Changes to medications? No   New conditions since last clinic visit? No   Unplanned office visit, urgent care, ED, or hospital admission in the last 4 weeks? No   How does patient/caregiver feel medication is working? Good   Financial problems or insurance changes? No   How many doses of your specialty medications were missed in the last 4 weeks? 0   Would patient like to speak to a pharmacist? No   When does the patient need to receive the medication? 03/22/22   Refill Delivery Questions    How will the patient receive the medication? Mail   When does the patient need to receive the medication? 03/22/22   Shipping Address Home   Address in [facility] confirmed and updated if neccessary? Yes   Expected Copay ($) 0   Is the patient able to afford the medication copay? Yes   Payment Method zero copay   Days supply of Refill 30   Supplies needed? No supplies needed   Refill activity completed? Yes   Refill activity plan Refill scheduled   Shipment/Pickup Date: 03/21/22          Current Outpatient Medications   Medication Sig    ascorbic acid (VITAMIN C) 1000 MG tablet Take 1,000 mg by mouth 2 (two) times daily.    cholecalciferol, vitamin D3, 50,000 unit capsule Take 1 capsule (50,000 Units total) by mouth every 7 days.    coenzyme Q10 (CO Q-10) 100 mg capsule Take 100 mg by mouth once daily.    diltiazem (CARDIZEM) 120 MG tablet Take 2 tablets (240 mg total) by mouth once daily.     DOCOSAHEXANOIC ACID/EPA (FISH OIL ORAL) Take 2,000 mg by mouth 2 (two) times daily.    esomeprazole (NEXIUM) 40 MG capsule TAKE ONE CAPSULE BY MOUTH BEFORE BREAKFAST    fluticasone (FLONASE) 50 mcg/actuation nasal spray 1 spray by Each Nare route once daily.    hydroxychloroquine (PLAQUENIL) 200 mg tablet TAKE 1 TABLET BY MOUTH TWICE A DAY    metoprolol succinate (TOPROL-XL) 100 MG 24 hr tablet Take 1 tablet (100 mg total) by mouth once daily.    multivitamin (ONE DAILY MULTIVITAMIN) per tablet Take 1 tablet by mouth once daily.    prasterone, dhea, (DHEA) 50 mg Cap Take 50 mg by mouth 2 (two) times daily.    pravastatin (PRAVACHOL) 40 MG tablet Take 40 mg by mouth once daily.    predniSONE (DELTASONE) 5 MG tablet Take 4 tablets (20 mg total) by mouth once daily. Or as directed    pyridoxine (VITAMIN B-6) 200 mg Tab Take by mouth.    sildenafil (REVATIO) 20 mg Tab Take 1 tablet by mouth three times daily.    tramadol (ULTRAM) 50 mg tablet Take 1 tablet (50 mg total) by mouth every 6 (six) hours as needed.    vitamin E 400 UNIT capsule Take 400 Units by mouth once daily.    zolpidem (AMBIEN) 5 MG Tab Take 1 tablet (5 mg total) by mouth nightly as needed.   Last reviewed on 3/4/2021  8:57 AM by Peter Manrique    Review of patient's allergies indicates:  No Known Allergies Last reviewed on  3/4/2021 8:57 AM by Kuldeep Manrique      Tasks added this encounter   4/14/2022 - Refill Call (Auto Added)   Tasks due within next 3 months   No tasks due.     Cipriano Baldwin, PharmD  [facility]  [address] [place] [id number]  Phone: [phone]  Fax: [phone]

## 2022-04-18 ENCOUNTER — PATIENT MESSAGE (OUTPATIENT)
Dept: ADMINISTRATIVE | Facility: OTHER | Age: 57
End: 2022-04-18
Payer: COMMERCIAL

## 2022-04-19 ENCOUNTER — SPECIALTY PHARMACY (OUTPATIENT)
Dept: PHARMACY | Facility: CLINIC | Age: 57
End: 2022-04-19
Payer: COMMERCIAL

## 2022-04-19 ENCOUNTER — PATIENT MESSAGE (OUTPATIENT)
Dept: PHARMACY | Facility: CLINIC | Age: 57
End: 2022-04-19
Payer: COMMERCIAL

## 2022-04-25 NOTE — TELEPHONE ENCOUNTER
Specialty Pharmacy - Refill Coordination    Specialty Medication Orders Linked to Encounter    Flowsheet Row Most Recent Value   Medication #1 sildenafil (REVATIO) 20 mg Tab (Order#[id number], Rx#[id number])          Refill Questions - Documented Responses    Flowsheet Row Most Recent Value   Patient Availability and HIPAA Verification    Does patient want to proceed with activity? Yes   HIPAA/medical authority confirmed? Yes   Relationship to patient of person spoken to? Self   Refill Screening Questions    Changes to allergies? No   Changes to medications? No   New conditions since last clinic visit? No   Unplanned office visit, urgent care, ED, or hospital admission in the last 4 weeks? No   How does patient/caregiver feel medication is working? Good   Financial problems or insurance changes? No   How many doses of your specialty medications were missed in the last 4 weeks? 0   Would patient like to speak to a pharmacist? No   When does the patient need to receive the medication? 04/27/22   Refill Delivery Questions    How will the patient receive the medication? Mail   When does the patient need to receive the medication? 04/27/22   Shipping Address Prescription   Address in [facility] confirmed and updated if neccessary? Yes   Expected Copay ($) 0   Is the patient able to afford the medication copay? Yes   Payment Method zero copay   Days supply of Refill 30   Supplies needed? No supplies needed   Refill activity completed? Yes   Refill activity plan Refill scheduled   Shipment/Pickup Date: 04/25/22          Current Outpatient Medications   Medication Sig    ascorbic acid (VITAMIN C) 1000 MG tablet Take 1,000 mg by mouth 2 (two) times daily.    cholecalciferol, vitamin D3, 50,000 unit capsule Take 1 capsule (50,000 Units total) by mouth every 7 days.    coenzyme Q10 (CO Q-10) 100 mg capsule Take 100 mg by mouth once daily.    diltiazem (CARDIZEM) 120 MG tablet Take 2 tablets (240 mg total) by mouth  once daily.    DOCOSAHEXANOIC ACID/EPA (FISH OIL ORAL) Take 2,000 mg by mouth 2 (two) times daily.    esomeprazole (NEXIUM) 40 MG capsule TAKE ONE CAPSULE BY MOUTH BEFORE BREAKFAST    fluticasone (FLONASE) 50 mcg/actuation nasal spray 1 spray by Each Nare route once daily.    hydroxychloroquine (PLAQUENIL) 200 mg tablet TAKE 1 TABLET BY MOUTH TWICE A DAY    metoprolol succinate (TOPROL-XL) 100 MG 24 hr tablet Take 1 tablet (100 mg total) by mouth once daily.    multivitamin (ONE DAILY MULTIVITAMIN) per tablet Take 1 tablet by mouth once daily.    prasterone, dhea, (DHEA) 50 mg Cap Take 50 mg by mouth 2 (two) times daily.    pravastatin (PRAVACHOL) 40 MG tablet Take 40 mg by mouth once daily.    predniSONE (DELTASONE) 5 MG tablet Take 4 tablets (20 mg total) by mouth once daily. Or as directed    pyridoxine (VITAMIN B-6) 200 mg Tab Take by mouth.    sildenafil (REVATIO) 20 mg Tab Take 1 tablet by mouth three times daily.    tramadol (ULTRAM) 50 mg tablet Take 1 tablet (50 mg total) by mouth every 6 (six) hours as needed.    vitamin E 400 UNIT capsule Take 400 Units by mouth once daily.    zolpidem (AMBIEN) 5 MG Tab Take 1 tablet (5 mg total) by mouth nightly as needed.   Last reviewed on 3/4/2021  8:57 AM by Peter Manrique    Review of patient's allergies indicates:  No Known Allergies Last reviewed on  3/4/2021 8:57 AM by Kuldeep Manrique      Tasks added this encounter   No tasks added.   Tasks due within next 3 months   4/14/2022 - Refill Call (Auto Added)     JAIRO ESPINAL, PharmD  [facility]  [address] [place] [id number]  Phone: [phone]  Fax: [phone]

## 2022-05-20 ENCOUNTER — SPECIALTY PHARMACY (OUTPATIENT)
Dept: PHARMACY | Facility: CLINIC | Age: 57
End: 2022-05-20
Payer: COMMERCIAL

## 2022-05-25 NOTE — TELEPHONE ENCOUNTER
Specialty Pharmacy - Refill Coordination    Specialty Medication Orders Linked to Encounter    Flowsheet Row Most Recent Value   Medication #1 sildenafil (REVATIO) 20 mg Tab (Order#[id number], Rx#[id number])          Refill Questions - Documented Responses    Flowsheet Row Most Recent Value   Patient Availability and HIPAA Verification    Does patient want to proceed with activity? Yes   HIPAA/medical authority confirmed? Yes   Relationship to patient of person spoken to? Self   Refill Screening Questions    Changes to allergies? No   Changes to medications? No   New conditions since last clinic visit? No   Unplanned office visit, urgent care, ED, or hospital admission in the last 4 weeks? No   How does patient/caregiver feel medication is working? Good   Financial problems or insurance changes? No   How many doses of your specialty medications were missed in the last 4 weeks? 0   Would patient like to speak to a pharmacist? No   When does the patient need to receive the medication? 05/29/22   Refill Delivery Questions    How will the patient receive the medication? Mail   When does the patient need to receive the medication? 05/29/22   Shipping Address Home   Address in [facility] confirmed and updated if neccessary? Yes   Expected Copay ($) 0   Is the patient able to afford the medication copay? Yes   Payment Method zero copay   Days supply of Refill 30   Supplies needed? No supplies needed   Refill activity completed? Yes   Refill activity plan Refill scheduled   Shipment/Pickup Date: 05/26/22          Current Outpatient Medications   Medication Sig    ascorbic acid (VITAMIN C) 1000 MG tablet Take 1,000 mg by mouth 2 (two) times daily.    cholecalciferol, vitamin D3, 50,000 unit capsule Take 1 capsule (50,000 Units total) by mouth every 7 days.    coenzyme Q10 (CO Q-10) 100 mg capsule Take 100 mg by mouth once daily.    diltiazem (CARDIZEM) 120 MG tablet Take 2 tablets (240 mg total) by mouth once  daily.    DOCOSAHEXANOIC ACID/EPA (FISH OIL ORAL) Take 2,000 mg by mouth 2 (two) times daily.    esomeprazole (NEXIUM) 40 MG capsule TAKE ONE CAPSULE BY MOUTH BEFORE BREAKFAST    fluticasone (FLONASE) 50 mcg/actuation nasal spray 1 spray by Each Nare route once daily.    hydroxychloroquine (PLAQUENIL) 200 mg tablet TAKE 1 TABLET BY MOUTH TWICE A DAY    metoprolol succinate (TOPROL-XL) 100 MG 24 hr tablet Take 1 tablet (100 mg total) by mouth once daily.    multivitamin (ONE DAILY MULTIVITAMIN) per tablet Take 1 tablet by mouth once daily.    prasterone, dhea, (DHEA) 50 mg Cap Take 50 mg by mouth 2 (two) times daily.    pravastatin (PRAVACHOL) 40 MG tablet Take 40 mg by mouth once daily.    predniSONE (DELTASONE) 5 MG tablet Take 4 tablets (20 mg total) by mouth once daily. Or as directed    pyridoxine (VITAMIN B-6) 200 mg Tab Take by mouth.    sildenafil (REVATIO) 20 mg Tab Take 1 tablet (20 mg total) by mouth three times daily.    tramadol (ULTRAM) 50 mg tablet Take 1 tablet (50 mg total) by mouth every 6 (six) hours as needed.    vitamin E 400 UNIT capsule Take 400 Units by mouth once daily.    zolpidem (AMBIEN) 5 MG Tab Take 1 tablet (5 mg total) by mouth nightly as needed.   Last reviewed on 3/4/2021  8:57 AM by Peter Manrique    Review of patient's allergies indicates:  No Known Allergies Last reviewed on  3/4/2021 8:57 AM by Kuldeep Manrique      Tasks added this encounter   6/21/2022 - Refill Call (Auto Added)   Tasks due within next 3 months   No tasks due.     Margot Wheatley, Patient Care Assistant  [facility]  [address] [place], [place] [address]  [facility] [address]  Phone: [phone]  Fax: [phone]

## 2022-06-21 ENCOUNTER — PATIENT MESSAGE (OUTPATIENT)
Dept: PHARMACY | Facility: CLINIC | Age: 57
End: 2022-06-21
Payer: COMMERCIAL

## 2022-07-05 ENCOUNTER — SPECIALTY PHARMACY (OUTPATIENT)
Dept: PHARMACY | Facility: CLINIC | Age: 57
End: 2022-07-05
Payer: COMMERCIAL

## 2022-07-05 NOTE — TELEPHONE ENCOUNTER
Specialty Pharmacy - Refill Coordination    Specialty Medication Orders Linked to Encounter    Flowsheet Row Most Recent Value   Medication #1 sildenafil (REVATIO) 20 mg Tab (Order#[id number], Rx#[id number])          Refill Questions - Documented Responses    Flowsheet Row Most Recent Value   Patient Availability and HIPAA Verification    Does patient want to proceed with activity? Yes   HIPAA/medical authority confirmed? Yes   Relationship to patient of person spoken to? Self   Refill Screening Questions    Changes to allergies? No   Changes to medications? No   New conditions since last clinic visit? No   Unplanned office visit, urgent care, ED, or hospital admission in the last 4 weeks? No   How does patient/caregiver feel medication is working? Good   Financial problems or insurance changes? No   How many doses of your specialty medications were missed in the last 4 weeks? 0   Would patient like to speak to a pharmacist? No   When does the patient need to receive the medication? 07/09/22   Refill Delivery Questions    How will the patient receive the medication? Delivery Linda   When does the patient need to receive the medication? 07/09/22   Shipping Address Prescription   Address in [facility] confirmed and updated if neccessary? Yes   Expected Copay ($) 0   Is the patient able to afford the medication copay? Yes   Payment Method zero copay   Days supply of Refill 30   Supplies needed? No supplies needed   Refill activity completed? Yes   Refill activity plan Refill scheduled   Shipment/Pickup Date: 07/06/22          Current Outpatient Medications   Medication Sig    ascorbic acid (VITAMIN C) 1000 MG tablet Take 1,000 mg by mouth 2 (two) times daily.    cholecalciferol, vitamin D3, 50,000 unit capsule Take 1 capsule (50,000 Units total) by mouth every 7 days.    coenzyme Q10 (CO Q-10) 100 mg capsule Take 100 mg by mouth once daily.    diltiazem (CARDIZEM) 120 MG tablet Take 2 tablets (240 mg total)  by mouth once daily.    DOCOSAHEXANOIC ACID/EPA (FISH OIL ORAL) Take 2,000 mg by mouth 2 (two) times daily.    esomeprazole (NEXIUM) 40 MG capsule TAKE ONE CAPSULE BY MOUTH BEFORE BREAKFAST    fluticasone (FLONASE) 50 mcg/actuation nasal spray 1 spray by Each Nare route once daily.    hydroxychloroquine (PLAQUENIL) 200 mg tablet TAKE 1 TABLET BY MOUTH TWICE A DAY    metoprolol succinate (TOPROL-XL) 100 MG 24 hr tablet Take 1 tablet (100 mg total) by mouth once daily.    multivitamin (ONE DAILY MULTIVITAMIN) per tablet Take 1 tablet by mouth once daily.    prasterone, dhea, (DHEA) 50 mg Cap Take 50 mg by mouth 2 (two) times daily.    pravastatin (PRAVACHOL) 40 MG tablet Take 40 mg by mouth once daily.    predniSONE (DELTASONE) 5 MG tablet Take 4 tablets (20 mg total) by mouth once daily. Or as directed    pyridoxine (VITAMIN B-6) 200 mg Tab Take by mouth.    sildenafil (REVATIO) 20 mg Tab Take 1 tablet (20 mg total) by mouth three times daily.    tramadol (ULTRAM) 50 mg tablet Take 1 tablet (50 mg total) by mouth every 6 (six) hours as needed.    vitamin E 400 UNIT capsule Take 400 Units by mouth once daily.    zolpidem (AMBIEN) 5 MG Tab Take 1 tablet (5 mg total) by mouth nightly as needed.   Last reviewed on 3/4/2021  8:57 AM by Peter Manrique    Review of patient's allergies indicates:  No Known Allergies Last reviewed on  3/4/2021 8:57 AM by Kuldeep Manrique      Tasks added this encounter   No tasks added.   Tasks due within next 3 months   8/1/2022 - Refill Call (Auto Added)     Tejinder Osorio, PharmD  [facility]  [address] [place] [id number]  Phone: [phone]  Fax: [phone]

## 2022-08-01 ENCOUNTER — PATIENT MESSAGE (OUTPATIENT)
Dept: PHARMACY | Facility: CLINIC | Age: 57
End: 2022-08-01
Payer: COMMERCIAL

## 2022-08-04 ENCOUNTER — SPECIALTY PHARMACY (OUTPATIENT)
Dept: PHARMACY | Facility: CLINIC | Age: 57
End: 2022-08-04
Payer: COMMERCIAL

## 2022-08-04 NOTE — TELEPHONE ENCOUNTER
Specialty Pharmacy - Refill Coordination    Specialty Medication Orders Linked to Encounter    Flowsheet Row Most Recent Value   Medication #1 sildenafil (REVATIO) 20 mg Tab (Order#[id number], Rx#[id number])          Refill Questions - Documented Responses    Flowsheet Row Most Recent Value   Patient Availability and HIPAA Verification    Does patient want to proceed with activity? Yes   HIPAA/medical authority confirmed? Yes   Relationship to patient of person spoken to? Self   Refill Screening Questions    Changes to allergies? No   Changes to medications? No   New conditions since last clinic visit? No   Unplanned office visit, urgent care, ED, or hospital admission in the last 4 weeks? No   How does patient/caregiver feel medication is working? Good   Financial problems or insurance changes? No   How many doses of your specialty medications were missed in the last 4 weeks? 0   Would patient like to speak to a pharmacist? No   When does the patient need to receive the medication? 08/10/22   Refill Delivery Questions    How will the patient receive the medication? Delivery Linda   When does the patient need to receive the medication? 08/10/22   Shipping Address Prescription   Address in [facility] confirmed and updated if neccessary? Yes   Expected Copay ($) 0   Is the patient able to afford the medication copay? Yes   Payment Method zero copay   Days supply of Refill 30   Supplies needed? No supplies needed   Refill activity completed? Yes   Refill activity plan Refill scheduled   Shipment/Pickup Date: 08/09/22          Current Outpatient Medications   Medication Sig    ascorbic acid (VITAMIN C) 1000 MG tablet Take 1,000 mg by mouth 2 (two) times daily.    cholecalciferol, vitamin D3, 50,000 unit capsule Take 1 capsule (50,000 Units total) by mouth every 7 days.    coenzyme Q10 (CO Q-10) 100 mg capsule Take 100 mg by mouth once daily.    diltiazem (CARDIZEM) 120 MG tablet Take 2 tablets (240 mg total)  by mouth once daily.    DOCOSAHEXANOIC ACID/EPA (FISH OIL ORAL) Take 2,000 mg by mouth 2 (two) times daily.    esomeprazole (NEXIUM) 40 MG capsule TAKE ONE CAPSULE BY MOUTH BEFORE BREAKFAST    fluticasone (FLONASE) 50 mcg/actuation nasal spray 1 spray by Each Nare route once daily.    hydroxychloroquine (PLAQUENIL) 200 mg tablet TAKE 1 TABLET BY MOUTH TWICE A DAY    metoprolol succinate (TOPROL-XL) 100 MG 24 hr tablet Take 1 tablet (100 mg total) by mouth once daily.    multivitamin (ONE DAILY MULTIVITAMIN) per tablet Take 1 tablet by mouth once daily.    prasterone, dhea, (DHEA) 50 mg Cap Take 50 mg by mouth 2 (two) times daily.    pravastatin (PRAVACHOL) 40 MG tablet Take 40 mg by mouth once daily.    predniSONE (DELTASONE) 5 MG tablet Take 4 tablets (20 mg total) by mouth once daily. Or as directed    pyridoxine (VITAMIN B-6) 200 mg Tab Take by mouth.    sildenafil (REVATIO) 20 mg Tab Take 1 tablet (20 mg total) by mouth three times daily.    tramadol (ULTRAM) 50 mg tablet Take 1 tablet (50 mg total) by mouth every 6 (six) hours as needed.    vitamin E 400 UNIT capsule Take 400 Units by mouth once daily.    zolpidem (AMBIEN) 5 MG Tab Take 1 tablet (5 mg total) by mouth nightly as needed.   Last reviewed on 3/4/2021  8:57 AM by Peter Manrique    Review of patient's allergies indicates:  No Known Allergies Last reviewed on  3/4/2021 8:57 AM by Kuldeep Manrique      Tasks added this encounter   9/2/2022 - Refill Call (Auto Added)   Tasks due within next 3 months   No tasks due.     Tenisha Dupont  [facility] - [facility]ty Pharmacy  [address] [place] [address]  Phone: [phone]  Fax: [phone]

## 2022-08-21 NOTE — TELEPHONE ENCOUNTER
DOCUMENTATION ONLY:  Prior authorization for REVATIO approved from 7/15/2020 to 7/14/2021.    Case ID# [id number]    Co-pay: $0.00    Patient Assistance IS NOT required.    Forwarding to [facility] for review and shipment. -HBR   [Use of Plain Language] : use of plain language [Adequate] : adequate [None] : none

## 2022-09-06 ENCOUNTER — SPECIALTY PHARMACY (OUTPATIENT)
Dept: PHARMACY | Facility: CLINIC | Age: 57
End: 2022-09-06
Payer: COMMERCIAL

## 2022-09-06 NOTE — TELEPHONE ENCOUNTER
Outgoing call to patient regarding Revatio. Patient has a week of medication remaining. Rx requires a PA. Rph routed.

## 2022-09-07 NOTE — TELEPHONE ENCOUNTER
Sildenafil PA# 2128 Approval 9/7/22 to 9/6/23 -max 12 fills -3 tablets/day limit.    LVM for patient callback for refill.

## 2022-09-16 NOTE — TELEPHONE ENCOUNTER
Specialty Pharmacy - Refill Coordination  Specialty Pharmacy - Medication/Referral Authorization    Specialty Medication Orders Linked to Encounter      Flowsheet Row Most Recent Value   Medication #1 sildenafil (REVATIO) 20 mg Tab (Order#[id number], Rx#[id number])            Refill Questions - Documented Responses      Flowsheet Row Most Recent Value   Patient Availability and HIPAA Verification    Does patient want to proceed with activity? Yes   HIPAA/medical authority confirmed? Yes   Relationship to patient of person spoken to? Self   Refill Screening Questions    Changes to allergies? No   Changes to medications? No   New conditions since last clinic visit? No   Unplanned office visit, urgent care, ED, or hospital admission in the last 4 weeks? No   How does patient/caregiver feel medication is working? Good   Financial problems or insurance changes? No   How many doses of your specialty medications were missed in the last 4 weeks? 0   Would patient like to speak to a pharmacist? No   When does the patient need to receive the medication? 09/19/22   Refill Delivery Questions    How will the patient receive the medication? Delivery Linda   When does the patient need to receive the medication? 09/19/22   Shipping Address Prescription   Address in [facility] confirmed and updated if neccessary? Yes   Expected Copay ($) 0   Is the patient able to afford the medication copay? Yes   Payment Method zero copay   Days supply of Refill 30   Supplies needed? No supplies needed   Refill activity completed? Yes   Refill activity plan Refill scheduled   Shipment/Pickup Date: 09/16/22            Current Outpatient Medications   Medication Sig    ascorbic acid (VITAMIN C) 1000 MG tablet Take 1,000 mg by mouth 2 (two) times daily.    cholecalciferol, vitamin D3, 50,000 unit capsule Take 1 capsule (50,000 Units total) by mouth every 7 days.    coenzyme Q10 (CO Q-10) 100 mg capsule Take 100 mg by mouth once daily.     diltiazem (CARDIZEM) 120 MG tablet Take 2 tablets (240 mg total) by mouth once daily.    DOCOSAHEXANOIC ACID/EPA (FISH OIL ORAL) Take 2,000 mg by mouth 2 (two) times daily.    esomeprazole (NEXIUM) 40 MG capsule TAKE ONE CAPSULE BY MOUTH BEFORE BREAKFAST    fluticasone (FLONASE) 50 mcg/actuation nasal spray 1 spray by Each Nare route once daily.    hydroxychloroquine (PLAQUENIL) 200 mg tablet TAKE 1 TABLET BY MOUTH TWICE A DAY    metoprolol succinate (TOPROL-XL) 100 MG 24 hr tablet Take 1 tablet (100 mg total) by mouth once daily.    multivitamin (ONE DAILY MULTIVITAMIN) per tablet Take 1 tablet by mouth once daily.    prasterone, dhea, (DHEA) 50 mg Cap Take 50 mg by mouth 2 (two) times daily.    pravastatin (PRAVACHOL) 40 MG tablet Take 40 mg by mouth once daily.    predniSONE (DELTASONE) 5 MG tablet Take 4 tablets (20 mg total) by mouth once daily. Or as directed    pyridoxine (VITAMIN B-6) 200 mg Tab Take by mouth.    sildenafil (REVATIO) 20 mg Tab Take 1 tablet (20 mg total) by mouth three times daily.    tramadol (ULTRAM) 50 mg tablet Take 1 tablet (50 mg total) by mouth every 6 (six) hours as needed.    vitamin E 400 UNIT capsule Take 400 Units by mouth once daily.    zolpidem (AMBIEN) 5 MG Tab Take 1 tablet (5 mg total) by mouth nightly as needed.   Last reviewed on 3/4/2021  8:57 AM by Peter Manrique    Review of patient's allergies indicates:  No Known Allergies Last reviewed on  3/4/2021 8:57 AM by Kuldeep Manrique      Tasks added this encounter   No tasks added.   Tasks due within next 3 months   10/12/2022 - Refill Call (Auto Added)     Tejinder Osorio, PharmD  [facility] - Specialty Pharmacy  [address] [place] [id number]  Phone: [phone]  Fax: [phone]

## 2022-10-12 ENCOUNTER — SPECIALTY PHARMACY (OUTPATIENT)
Dept: PHARMACY | Facility: CLINIC | Age: 57
End: 2022-10-12
Payer: COMMERCIAL

## 2022-10-12 NOTE — TELEPHONE ENCOUNTER
Outgoing call regarding sildenafil refill; per pt, she has about 12 days on hand; informed her that [facility] will follow up on 10/17 to schedule delivery

## 2022-10-17 ENCOUNTER — PATIENT MESSAGE (OUTPATIENT)
Dept: PHARMACY | Facility: CLINIC | Age: 57
End: 2022-10-17
Payer: COMMERCIAL

## 2022-10-21 NOTE — TELEPHONE ENCOUNTER
Specialty Pharmacy - Refill Coordination    Specialty Medication Orders Linked to Encounter      Flowsheet Row Most Recent Value   Medication #1 sildenafil (REVATIO) 20 mg Tab (Order#[id number], Rx#[id number])            Refill Questions - Documented Responses      Flowsheet Row Most Recent Value   Patient Availability and HIPAA Verification    Does patient want to proceed with activity? Yes   HIPAA/medical authority confirmed? Yes   Relationship to patient of person spoken to? Self   Refill Screening Questions    Changes to allergies? No   Changes to medications? No   New conditions since last clinic visit? No   Unplanned office visit, urgent care, ED, or hospital admission in the last 4 weeks? No   How does patient/caregiver feel medication is working? Good   Financial problems or insurance changes? No   How many doses of your specialty medications were missed in the last 4 weeks? 0   Would patient like to speak to a pharmacist? No   When does the patient need to receive the medication? 10/25/22   Refill Delivery Questions    How will the patient receive the medication? MEDRx   When does the patient need to receive the medication? 10/25/22   Shipping Address Prescription   Address in [facility] confirmed and updated if neccessary? Yes   Expected Copay ($) 0   Is the patient able to afford the medication copay? Yes   Payment Method zero copay   Days supply of Refill 30   Supplies needed? No supplies needed   Refill activity completed? Yes   Refill activity plan Refill scheduled   Shipment/Pickup Date: 10/24/22            Current Outpatient Medications   Medication Sig    ascorbic acid (VITAMIN C) 1000 MG tablet Take 1,000 mg by mouth 2 (two) times daily.    cholecalciferol, vitamin D3, 50,000 unit capsule Take 1 capsule (50,000 Units total) by mouth every 7 days.    coenzyme Q10 (CO Q-10) 100 mg capsule Take 100 mg by mouth once daily.    diltiazem (CARDIZEM) 120 MG tablet Take 2 tablets (240 mg total) by  mouth once daily.    DOCOSAHEXANOIC ACID/EPA (FISH OIL ORAL) Take 2,000 mg by mouth 2 (two) times daily.    esomeprazole (NEXIUM) 40 MG capsule TAKE ONE CAPSULE BY MOUTH BEFORE BREAKFAST    fluticasone (FLONASE) 50 mcg/actuation nasal spray 1 spray by Each Nare route once daily.    hydroxychloroquine (PLAQUENIL) 200 mg tablet TAKE 1 TABLET BY MOUTH TWICE A DAY    metoprolol succinate (TOPROL-XL) 100 MG 24 hr tablet Take 1 tablet (100 mg total) by mouth once daily.    multivitamin (ONE DAILY MULTIVITAMIN) per tablet Take 1 tablet by mouth once daily.    prasterone, dhea, (DHEA) 50 mg Cap Take 50 mg by mouth 2 (two) times daily.    pravastatin (PRAVACHOL) 40 MG tablet Take 40 mg by mouth once daily.    predniSONE (DELTASONE) 5 MG tablet Take 4 tablets (20 mg total) by mouth once daily. Or as directed    pyridoxine (VITAMIN B-6) 200 mg Tab Take by mouth.    sildenafil (REVATIO) 20 mg Tab Take 1 tablet (20 mg total) by mouth three times daily.    tramadol (ULTRAM) 50 mg tablet Take 1 tablet (50 mg total) by mouth every 6 (six) hours as needed.    vitamin E 400 UNIT capsule Take 400 Units by mouth once daily.    zolpidem (AMBIEN) 5 MG Tab Take 1 tablet (5 mg total) by mouth nightly as needed.   Last reviewed on 3/4/2021  8:57 AM by Peter Manrique    Review of patient's allergies indicates:  No Known Allergies Last reviewed on  3/4/2021 8:57 AM by Kuldeep Manrique      Tasks added this encounter   No tasks added.   Tasks due within next 3 months   11/17/2022 - Refill Call (Auto Added)     Tejinder Osorio, PharmD  [facility] - Specialty Pharmacy  [address] [place] [id number]  Phone: [phone]  Fax: [phone]

## 2022-11-17 ENCOUNTER — PATIENT MESSAGE (OUTPATIENT)
Dept: PHARMACY | Facility: CLINIC | Age: 57
End: 2022-11-17
Payer: COMMERCIAL

## 2022-11-21 ENCOUNTER — PATIENT MESSAGE (OUTPATIENT)
Dept: PHARMACY | Facility: CLINIC | Age: 57
End: 2022-11-21
Payer: COMMERCIAL

## 2022-11-28 ENCOUNTER — SPECIALTY PHARMACY (OUTPATIENT)
Dept: PHARMACY | Facility: CLINIC | Age: 57
End: 2022-11-28
Payer: COMMERCIAL

## 2022-11-28 NOTE — TELEPHONE ENCOUNTER
Specialty Pharmacy - Refill Coordination    Specialty Medication Orders Linked to Encounter      Flowsheet Row Most Recent Value   Medication #1 sildenafil (REVATIO) 20 mg Tab (Order#[id number], Rx#[id number])            Refill Questions - Documented Responses      Flowsheet Row Most Recent Value   Patient Availability and HIPAA Verification    Does patient want to proceed with activity? Yes   HIPAA/medical authority confirmed? Yes   Relationship to patient of person spoken to? Self   Refill Screening Questions    Changes to allergies? No   Changes to medications? No   New conditions since last clinic visit? No   Unplanned office visit, urgent care, ED, or hospital admission in the last 4 weeks? No   How does patient/caregiver feel medication is working? Good   Financial problems or insurance changes? No   How many doses of your specialty medications were missed in the last 4 weeks? 0   Would patient like to speak to a pharmacist? No   When does the patient need to receive the medication? 12/03/22   Refill Delivery Questions    How will the patient receive the medication? Mail   When does the patient need to receive the medication? 12/03/22   Shipping Address Prescription   Address in [facility] confirmed and updated if neccessary? Yes   Expected Copay ($) 0   Is the patient able to afford the medication copay? Yes   Payment Method zero copay   Days supply of Refill 30   Supplies needed? No supplies needed   Refill activity completed? Yes   Refill activity plan Refill scheduled   Shipment/Pickup Date: 11/29/22            Current Outpatient Medications   Medication Sig    ascorbic acid (VITAMIN C) 1000 MG tablet Take 1,000 mg by mouth 2 (two) times daily.    cholecalciferol, vitamin D3, 50,000 unit capsule Take 1 capsule (50,000 Units total) by mouth every 7 days.    coenzyme Q10 (CO Q-10) 100 mg capsule Take 100 mg by mouth once daily.    diltiazem (CARDIZEM) 120 MG tablet Take 2 tablets (240 mg total) by  mouth once daily.    DOCOSAHEXANOIC ACID/EPA (FISH OIL ORAL) Take 2,000 mg by mouth 2 (two) times daily.    esomeprazole (NEXIUM) 40 MG capsule TAKE ONE CAPSULE BY MOUTH BEFORE BREAKFAST    fluticasone (FLONASE) 50 mcg/actuation nasal spray 1 spray by Each Nare route once daily.    hydroxychloroquine (PLAQUENIL) 200 mg tablet TAKE 1 TABLET BY MOUTH TWICE A DAY    metoprolol succinate (TOPROL-XL) 100 MG 24 hr tablet Take 1 tablet (100 mg total) by mouth once daily.    multivitamin (ONE DAILY MULTIVITAMIN) per tablet Take 1 tablet by mouth once daily.    prasterone, dhea, (DHEA) 50 mg Cap Take 50 mg by mouth 2 (two) times daily.    pravastatin (PRAVACHOL) 40 MG tablet Take 40 mg by mouth once daily.    predniSONE (DELTASONE) 5 MG tablet Take 4 tablets (20 mg total) by mouth once daily. Or as directed    pyridoxine (VITAMIN B-6) 200 mg Tab Take by mouth.    sildenafil (REVATIO) 20 mg Tab Take 1 tablet (20 mg total) by mouth three times daily.    tramadol (ULTRAM) 50 mg tablet Take 1 tablet (50 mg total) by mouth every 6 (six) hours as needed.    vitamin E 400 UNIT capsule Take 400 Units by mouth once daily.    zolpidem (AMBIEN) 5 MG Tab Take 1 tablet (5 mg total) by mouth nightly as needed.   Last reviewed on 3/4/2021  8:57 AM by Peter Manrique    Review of patient's allergies indicates:  No Known Allergies Last reviewed on  3/4/2021 8:57 AM by Kuldeep Manrique      Tasks added this encounter   No tasks added.   Tasks due within next 3 months   12/26/2022 - Refill Call (Auto Added)     Tejinder Osorio, PharmD  [facility] - Specialty Pharmacy  [address] [place] [id number]  Phone: [phone]  Fax: [phone]

## 2022-12-30 ENCOUNTER — SPECIALTY PHARMACY (OUTPATIENT)
Dept: PHARMACY | Facility: CLINIC | Age: 57
End: 2022-12-30
Payer: COMMERCIAL

## 2022-12-30 NOTE — TELEPHONE ENCOUNTER
Specialty Pharmacy - Refill Coordination    Specialty Medication Orders Linked to Encounter      Flowsheet Row Most Recent Value   Medication #1 sildenafil (REVATIO) 20 mg Tab (Order#[id number], Rx#[id number])            Refill Questions - Documented Responses      Flowsheet Row Most Recent Value   Patient Availability and HIPAA Verification    Does patient want to proceed with activity? Yes   HIPAA/medical authority confirmed? Yes   Relationship to patient of person spoken to? Self   Refill Screening Questions    Changes to allergies? No   Changes to medications? No   New conditions since last clinic visit? No   Unplanned office visit, urgent care, ED, or hospital admission in the last 4 weeks? No   How does patient/caregiver feel medication is working? Good   Financial problems or insurance changes? No   How many doses of your specialty medications were missed in the last 4 weeks? 0   Would patient like to speak to a pharmacist? No   When does the patient need to receive the medication? 01/06/23   Refill Delivery Questions    How will the patient receive the medication? MEDRx   When does the patient need to receive the medication? 01/06/23   Shipping Address Prescription   Address in [facility] confirmed and updated if neccessary? Yes   Expected Copay ($) 0   Is the patient able to afford the medication copay? Yes   Payment Method zero copay   Days supply of Refill 30   Supplies needed? No supplies needed   Refill activity completed? Yes   Refill activity plan Refill scheduled   Shipment/Pickup Date: 01/03/22            Current Outpatient Medications   Medication Sig    ascorbic acid (VITAMIN C) 1000 MG tablet Take 1,000 mg by mouth 2 (two) times daily.    cholecalciferol, vitamin D3, 50,000 unit capsule Take 1 capsule (50,000 Units total) by mouth every 7 days.    coenzyme Q10 (CO Q-10) 100 mg capsule Take 100 mg by mouth once daily.    diltiazem (CARDIZEM) 120 MG tablet Take 2 tablets (240 mg total) by  mouth once daily.    DOCOSAHEXANOIC ACID/EPA (FISH OIL ORAL) Take 2,000 mg by mouth 2 (two) times daily.    esomeprazole (NEXIUM) 40 MG capsule TAKE ONE CAPSULE BY MOUTH BEFORE BREAKFAST    fluticasone (FLONASE) 50 mcg/actuation nasal spray 1 spray by Each Nare route once daily.    hydroxychloroquine (PLAQUENIL) 200 mg tablet TAKE 1 TABLET BY MOUTH TWICE A DAY    metoprolol succinate (TOPROL-XL) 100 MG 24 hr tablet Take 1 tablet (100 mg total) by mouth once daily.    multivitamin (ONE DAILY MULTIVITAMIN) per tablet Take 1 tablet by mouth once daily.    prasterone, dhea, (DHEA) 50 mg Cap Take 50 mg by mouth 2 (two) times daily.    pravastatin (PRAVACHOL) 40 MG tablet Take 40 mg by mouth once daily.    predniSONE (DELTASONE) 5 MG tablet Take 4 tablets (20 mg total) by mouth once daily. Or as directed    pyridoxine (VITAMIN B-6) 200 mg Tab Take by mouth.    sildenafil (REVATIO) 20 mg Tab Take 1 tablet (20 mg total) by mouth three times daily.    tramadol (ULTRAM) 50 mg tablet Take 1 tablet (50 mg total) by mouth every 6 (six) hours as needed.    vitamin E 400 UNIT capsule Take 400 Units by mouth once daily.    zolpidem (AMBIEN) 5 MG Tab Take 1 tablet (5 mg total) by mouth nightly as needed.   Last reviewed on 3/4/2021  8:57 AM by Peter Manrique    Review of patient's allergies indicates:  No Known Allergies Last reviewed on  3/4/2021 8:57 AM by Kuldeep Manrique      Tasks added this encounter   1/29/2023 - Refill Call (Auto Added)   Tasks due within next 3 months   No tasks due.     [facility] - Specialty Pharmacy  [address] [place] [phone]  Phone: [phone]  Fax: [phone]

## 2023-01-04 ENCOUNTER — DOCUMENTATION ONLY (OUTPATIENT)
Dept: ADMINISTRATIVE | Facility: HOSPITAL | Age: 58
End: 2023-01-04
Payer: COMMERCIAL

## 2023-01-06 ENCOUNTER — LAB VISIT (OUTPATIENT)
Dept: LAB | Facility: HOSPITAL | Age: 58
End: 2023-01-06
Attending: PHYSICIAN ASSISTANT
Payer: COMMERCIAL

## 2023-01-06 ENCOUNTER — OFFICE VISIT (OUTPATIENT)
Dept: RHEUMATOLOGY | Facility: CLINIC | Age: 58
End: 2023-01-06
Payer: COMMERCIAL

## 2023-01-06 VITALS
BODY MASS INDEX: 36.57 KG/M2 | HEART RATE: 52 BPM | SYSTOLIC BLOOD PRESSURE: 110 MMHG | HEIGHT: 67 IN | WEIGHT: 233 LBS | DIASTOLIC BLOOD PRESSURE: 63 MMHG

## 2023-01-06 DIAGNOSIS — M34.1 CREST SYNDROME: Primary | ICD-10-CM

## 2023-01-06 DIAGNOSIS — M34.1 CREST SYNDROME: ICD-10-CM

## 2023-01-06 DIAGNOSIS — M35.9 CONNECTIVE TISSUE DISEASE, UNDIFFERENTIATED: ICD-10-CM

## 2023-01-06 LAB
ALBUMIN SERPL BCP-MCNC: 4.2 G/DL (ref 3.5–5.2)
ALP SERPL-CCNC: 101 U/L (ref 55–135)
ALT SERPL W/O P-5'-P-CCNC: 21 U/L (ref 10–44)
ANION GAP SERPL CALC-SCNC: 9 MMOL/L (ref 8–16)
AST SERPL-CCNC: 26 U/L (ref 10–40)
BASOPHILS # BLD AUTO: 0.03 K/UL (ref 0–0.2)
BASOPHILS NFR BLD: 0.9 % (ref 0–1.9)
BILIRUB SERPL-MCNC: 0.5 MG/DL (ref 0.1–1)
BUN SERPL-MCNC: 26 MG/DL (ref 6–20)
CALCIUM SERPL-MCNC: 9.5 MG/DL (ref 8.7–10.5)
CHLORIDE SERPL-SCNC: 107 MMOL/L (ref 95–110)
CO2 SERPL-SCNC: 23 MMOL/L (ref 23–29)
CREAT SERPL-MCNC: 0.9 MG/DL (ref 0.5–1.4)
CRP SERPL-MCNC: 1.3 MG/L (ref 0–8.2)
DIFFERENTIAL METHOD: ABNORMAL
EOSINOPHIL # BLD AUTO: 0.1 K/UL (ref 0–0.5)
EOSINOPHIL NFR BLD: 2.5 % (ref 0–8)
ERYTHROCYTE [DISTWIDTH] IN BLOOD BY AUTOMATED COUNT: 12.7 % (ref 11.5–14.5)
ERYTHROCYTE [SEDIMENTATION RATE] IN BLOOD BY PHOTOMETRIC METHOD: 4 MM/HR (ref 0–36)
EST. GFR  (NO RACE VARIABLE): >60 ML/MIN/1.73 M^2
GLUCOSE SERPL-MCNC: 84 MG/DL (ref 70–110)
HCT VFR BLD AUTO: 35.3 % (ref 37–48.5)
HGB BLD-MCNC: 12.5 G/DL (ref 12–16)
IMM GRANULOCYTES # BLD AUTO: 0 K/UL (ref 0–0.04)
IMM GRANULOCYTES NFR BLD AUTO: 0 % (ref 0–0.5)
LYMPHOCYTES # BLD AUTO: 1.5 K/UL (ref 1–4.8)
LYMPHOCYTES NFR BLD: 47.6 % (ref 18–48)
MCH RBC QN AUTO: 29.3 PG (ref 27–31)
MCHC RBC AUTO-ENTMCNC: 35.4 G/DL (ref 32–36)
MCV RBC AUTO: 83 FL (ref 82–98)
MONOCYTES # BLD AUTO: 0.4 K/UL (ref 0.3–1)
MONOCYTES NFR BLD: 11.7 % (ref 4–15)
NEUTROPHILS # BLD AUTO: 1.2 K/UL (ref 1.8–7.7)
NEUTROPHILS NFR BLD: 37.3 % (ref 38–73)
NRBC BLD-RTO: 0 /100 WBC
PLATELET # BLD AUTO: 155 K/UL (ref 150–450)
PMV BLD AUTO: 9.9 FL (ref 9.2–12.9)
POTASSIUM SERPL-SCNC: 4.4 MMOL/L (ref 3.5–5.1)
PROT SERPL-MCNC: 7.6 G/DL (ref 6–8.4)
RBC # BLD AUTO: 4.27 M/UL (ref 4–5.4)
SODIUM SERPL-SCNC: 139 MMOL/L (ref 136–145)
WBC # BLD AUTO: 3.17 K/UL (ref 3.9–12.7)

## 2023-01-06 PROCEDURE — 86039 ANTINUCLEAR ANTIBODIES (ANA): CPT | Performed by: PHYSICIAN ASSISTANT

## 2023-01-06 PROCEDURE — 3078F PR MOST RECENT DIASTOLIC BLOOD PRESSURE < 80 MM HG: ICD-10-PCS | Mod: CPTII,S$GLB,, | Performed by: PHYSICIAN ASSISTANT

## 2023-01-06 PROCEDURE — 85025 COMPLETE CBC W/AUTO DIFF WBC: CPT | Performed by: PHYSICIAN ASSISTANT

## 2023-01-06 PROCEDURE — 99999 PR PBB SHADOW E&M-EST. PATIENT-LVL IV: ICD-10-PCS | Mod: PBBFAC,,, | Performed by: PHYSICIAN ASSISTANT

## 2023-01-06 PROCEDURE — 3074F PR MOST RECENT SYSTOLIC BLOOD PRESSURE < 130 MM HG: ICD-10-PCS | Mod: CPTII,S$GLB,, | Performed by: PHYSICIAN ASSISTANT

## 2023-01-06 PROCEDURE — 1159F MED LIST DOCD IN RCRD: CPT | Mod: CPTII,S$GLB,, | Performed by: PHYSICIAN ASSISTANT

## 2023-01-06 PROCEDURE — 86038 ANTINUCLEAR ANTIBODIES: CPT | Performed by: PHYSICIAN ASSISTANT

## 2023-01-06 PROCEDURE — 86140 C-REACTIVE PROTEIN: CPT | Performed by: PHYSICIAN ASSISTANT

## 2023-01-06 PROCEDURE — 99203 OFFICE O/P NEW LOW 30 MIN: CPT | Mod: S$GLB,,, | Performed by: PHYSICIAN ASSISTANT

## 2023-01-06 PROCEDURE — 1159F PR MEDICATION LIST DOCUMENTED IN MEDICAL RECORD: ICD-10-PCS | Mod: CPTII,S$GLB,, | Performed by: PHYSICIAN ASSISTANT

## 2023-01-06 PROCEDURE — 86146 BETA-2 GLYCOPROTEIN ANTIBODY: CPT | Performed by: PHYSICIAN ASSISTANT

## 2023-01-06 PROCEDURE — 1160F PR REVIEW ALL MEDS BY PRESCRIBER/CLIN PHARMACIST DOCUMENTED: ICD-10-PCS | Mod: CPTII,S$GLB,, | Performed by: PHYSICIAN ASSISTANT

## 2023-01-06 PROCEDURE — 3074F SYST BP LT 130 MM HG: CPT | Mod: CPTII,S$GLB,, | Performed by: PHYSICIAN ASSISTANT

## 2023-01-06 PROCEDURE — 99203 PR OFFICE/OUTPT VISIT, NEW, LEVL III, 30-44 MIN: ICD-10-PCS | Mod: S$GLB,,, | Performed by: PHYSICIAN ASSISTANT

## 2023-01-06 PROCEDURE — 99999 PR PBB SHADOW E&M-EST. PATIENT-LVL IV: CPT | Mod: PBBFAC,,, | Performed by: PHYSICIAN ASSISTANT

## 2023-01-06 PROCEDURE — 3078F DIAST BP <80 MM HG: CPT | Mod: CPTII,S$GLB,, | Performed by: PHYSICIAN ASSISTANT

## 2023-01-06 PROCEDURE — 1160F RVW MEDS BY RX/DR IN RCRD: CPT | Mod: CPTII,S$GLB,, | Performed by: PHYSICIAN ASSISTANT

## 2023-01-06 PROCEDURE — 86225 DNA ANTIBODY NATIVE: CPT | Performed by: PHYSICIAN ASSISTANT

## 2023-01-06 PROCEDURE — 80053 COMPREHEN METABOLIC PANEL: CPT | Performed by: PHYSICIAN ASSISTANT

## 2023-01-06 PROCEDURE — 85652 RBC SED RATE AUTOMATED: CPT | Performed by: PHYSICIAN ASSISTANT

## 2023-01-06 PROCEDURE — 3008F BODY MASS INDEX DOCD: CPT | Mod: CPTII,S$GLB,, | Performed by: PHYSICIAN ASSISTANT

## 2023-01-06 PROCEDURE — 3008F PR BODY MASS INDEX (BMI) DOCUMENTED: ICD-10-PCS | Mod: CPTII,S$GLB,, | Performed by: PHYSICIAN ASSISTANT

## 2023-01-06 PROCEDURE — 86235 NUCLEAR ANTIGEN ANTIBODY: CPT | Performed by: PHYSICIAN ASSISTANT

## 2023-01-06 PROCEDURE — 83520 IMMUNOASSAY QUANT NOS NONAB: CPT | Performed by: PHYSICIAN ASSISTANT

## 2023-01-06 RX ORDER — MACITENTAN 10 MG/1
1 TABLET, FILM COATED ORAL
COMMUNITY
Start: 2022-05-27

## 2023-01-06 RX ORDER — HYDROXYCHLOROQUINE SULFATE 200 MG/1
400 TABLET, FILM COATED ORAL DAILY
Qty: 180 TABLET | Refills: 1 | Status: SHIPPED | OUTPATIENT
Start: 2023-01-06 | End: 2023-05-15 | Stop reason: SDUPTHER

## 2023-01-06 RX ORDER — AMLODIPINE BESYLATE 10 MG/1
1 TABLET ORAL DAILY
COMMUNITY
Start: 2022-05-30

## 2023-01-06 RX ORDER — SOTALOL HYDROCHLORIDE 80 MG/1
1 TABLET ORAL
COMMUNITY

## 2023-01-06 RX ORDER — MINERAL OIL
1 ENEMA (ML) RECTAL
COMMUNITY

## 2023-01-06 RX ORDER — LISINOPRIL 40 MG/1
1 TABLET ORAL
COMMUNITY
Start: 2022-05-30 | End: 2023-11-27

## 2023-01-06 RX ORDER — ASPIRIN 81 MG/1
1 TABLET ORAL
COMMUNITY

## 2023-01-06 RX ORDER — ZINC GLUCONATE 100 MG
1 TABLET ORAL
COMMUNITY

## 2023-01-06 NOTE — PROGRESS NOTES
Subjective:      Patient ID: Candie Gamboa is a 57 y.o. female.    Chief Complaint: No chief complaint on file.      HPI   Candie Gamboa  is a 57 y.o. female who is a former patient of the Rheumatology Department here at [facility].  She has not been seen since 2017.  More recently has been seeing Rheumatology in [place].  Recently her rheumatologist retired.  She decided to reestablish care here.    She has history of mixed connective tissue disease as well as crest syndrome per her report.  She is on Plaquenil 400 mg daily.  Raynaud's is well controlled.  Reflux is also well controlled.  She tells me she has a limited scleroderma picture.  Since being seen here last, she has been diagnosed with hypergammaglobulinemia.  She follows with Hematology Oncology at the [facility].  She is also since been diagnosed with pulmonary hypertension.  She sees a pulmonologist in [place] as well as at the [facility].  She also follows with [facility] for cardiology concerns.    Was on Imuran as well.  She had developed some joint pain.  This helped her symptoms.  With falling white counts they decided to take her off of the Imuran.  This was a couple of months ago.  Joint symptoms have not worsened.  She has been on Plaquenil for over 10 years.  Last eye exam was fall 2022 per patient report.  She says there was no evidence of plaq related maculopathy at that time.    Seeing [facility] for Stage 2 CKD.    Minimal aches and pains today.  She says it is 2/10.  More so the knees.  She denies prolonged morning stiffness.  Denies tender swollen joints as well as weakness.  No lymphadenopathy.  Pulmonary function is stable per report.    Patient denies fevers, chills, photosensitivity, eye pain, shortness of breath, chest pain, hematuria, blood in the stool, rash, sicca symptoms, raynauds, finger ulcerations.  Rheumatologic systems otherwise negative.    Serologies/Labs:  +EMILIANO 1:320  centromere, neg profile - 2016  Current Treatment:  Plaquenil 400 mg daily - 10+ years of tx  Previous Treatment:   Imuran 100 mg daily      Current Outpatient Medications:     amLODIPine (NORVASC) 10 MG tablet, Take 1 tablet by mouth once daily., Disp: , Rfl:     ascorbic acid, vitamin C, (VITAMIN C) 1000 MG tablet, Take 1,000 mg by mouth 2 (two) times daily., Disp: , Rfl:     aspirin (ECOTRIN) 81 MG EC tablet, 1 tablet., Disp: , Rfl:     DOCOSAHEXANOIC ACID/EPA (FISH OIL ORAL), Take 2,000 mg by mouth 2 (two) times daily., Disp: , Rfl:     esomeprazole (NEXIUM) 40 MG capsule, TAKE ONE CAPSULE BY MOUTH BEFORE BREAKFAST, Disp: 90 capsule, Rfl: 1    fexofenadine (ALLEGRA) 180 MG tablet, 1 tablet as needed., Disp: , Rfl:     fluticasone (FLONASE) 50 mcg/actuation nasal spray, 1 spray by Each Nare route once daily., Disp: , Rfl:     lisinopriL (PRINIVIL,ZESTRIL) 40 MG tablet, 1 tablet., Disp: , Rfl:     macitentan (OPSUMIT) 10 mg Tab, 1 tablet., Disp: , Rfl:     multivitamin (THERAGRAN) per tablet, Take 1 tablet by mouth once daily., Disp: , Rfl:     pravastatin (PRAVACHOL) 40 MG tablet, Take 40 mg by mouth once daily., Disp: , Rfl:     pyridoxine, vitamin B6, 200 mg Tab, Take by mouth., Disp: , Rfl:     sildenafil (REVATIO) 20 mg Tab, Take 1 tablet (20 mg total) by mouth three times daily., Disp: 90 tablet, Rfl: 6    sotaloL (BETAPACE) 80 MG tablet, 1 tablet., Disp: , Rfl:     vitamin E 400 UNIT capsule, Take 400 Units by mouth once daily., Disp: , Rfl:     zinc gluconate 100 mg Tab, 1 tablet., Disp: , Rfl:     cholecalciferol, vitamin D3, 50,000 unit capsule, Take 1 capsule (50,000 Units total) by mouth every 7 days., Disp: 12 capsule, Rfl: 3    coenzyme Q10 (CO Q-10) 100 mg capsule, Take 100 mg by mouth once daily., Disp: , Rfl:     diltiazem (CARDIZEM) 120 MG tablet, Take 2 tablets (240 mg total) by mouth once daily., Disp: 60 tablet, Rfl: 6    hydrOXYchloroQUINE (PLAQUENIL) 200 mg tablet, Take 2 tablets (400 mg  "total) by mouth once daily., Disp: 180 tablet, Rfl: 1    Past Medical History:   Diagnosis Date    *Atrial fibrillation     Acid reflux     Allergy     Anemia     Anxiety     Dry eyes     Dry mouth     Hyperlipidemia     Hypertension     Sickle cell anemia      Family History   Problem Relation Age of Onset    Hypertension Mother     Rheum arthritis Mother     Cancer Father     Hypertension Sister     Hyperlipidemia Brother      Social History     Socioeconomic History    Marital status:    Tobacco Use    Smoking status: Never   Substance and Sexual Activity    Alcohol use: No    Drug use: No     Review of patient's allergies indicates:  No Known Allergies    Objective:   /63 (BP Location: Right arm, Patient Position: Sitting)   Pulse (!) 52   Ht 5' 7" (1.702 m)   Wt 105.7 kg (233 lb 0.4 oz)   BMI 36.50 kg/m²   Immunization History   Administered Date(s) Administered    Pneumococcal Conjugate - 13 Valent 01/20/2014       Physical Exam   Constitutional: She is oriented to person, place, and time. No distress.   HENT:   Head: Normocephalic and atraumatic.   Pulmonary/Chest: Effort normal.   Abdominal: She exhibits no distension.   Musculoskeletal:         General: No swelling or tenderness. Normal range of motion.      Cervical back: Normal range of motion.   Lymphadenopathy:     She has no cervical adenopathy.   Neurological: She is alert and oriented to person, place, and time.   Skin: Skin is warm and dry. No rash noted.   Psychiatric: Mood normal.   Nursing note and vitals reviewed.    No synovitis, no dactylitis  No digital ulcerations  Normal nailfold capillary    No results found for this or any previous visit (from the past 672 hour(s)).    No results found for: TBGOLDPLUS   No results found for: HAV, HEPAIGM, HEPBIGM, HEPBCAB, HBEAG, HEPCAB       Assessment:     1. CREST syndrome    2. Connective tissue disease, undifferentiated            Plan:     Diagnoses and all orders for this " visit:    CREST syndrome  -     RNA polymerase III Ab, IgG; Future  -     Anti-scleroderma antibody; Future  -     Beta 2 Glycoprotein I Antibody, IgA; Future  -     hydrOXYchloroQUINE (PLAQUENIL) 200 mg tablet; Take 2 tablets (400 mg total) by mouth once daily.    Connective tissue disease, undifferentiated  -     CBC Auto Differential; Standing  -     Comprehensive Metabolic Panel; Standing  -     Sedimentation rate; Standing  -     C-Reactive Protein; Standing  -     EMILIANO Screen w/Reflex; Future  -     hydrOXYchloroQUINE (PLAQUENIL) 200 mg tablet; Take 2 tablets (400 mg total) by mouth once daily.        MCTD w overlapping CREST syndrome  Labs today  Plaquenil 400 mg daily  No maculopathy fall 2022 per pt report  Pt aware of importance for yearly eye exams to monitor for Plaquenil related toxicity, especially given chronicity of her therapy.  Pulm HTN  Follows w cards and pulm  Pulm function stable  No CP/SOB  Mgmt per pulm/cards  Raynauds  No prolonged attacks  No digital ulcerations  Pt to notify me if sxs change  GERD controlled on nexium  Hypergammaglobulinemia  Mgmt per hematology  Drug therapy requiring intensive monitoring for toxicity  High Risk Medication Monitoring encounter  No current medication related issues, no evidence of toxicity  I ordered labs for toxicity monitoring, have personally reviewed the findings, and discussed them with the patient.  Pending labs will be sent via the portal  Compromised immune system secondary to autoimmune disease and/or use of immunosuppressive drugs.  Monitor carefully for infections.  Advised patient to get immediate medical care if any infection arises.  Also advised strict adherence age-appropriate vaccinations and cancer screenings with PCP.  Patient advised to hold DMARD and/or biologic therapy for signs of infection or for surgery. If you are unsure what to do please call our office for instruction.[facility] [phone]  Return to clinic: 4  mos w reg 4 prior    Follow up in about 4 months (around 5/6/2023).    The patient understands, chooses and consents to this plan and accepts all the risks which include but are not limited to the risks mentioned above.     Disclaimer: This note was prepared using a voice recognition system and is likely to have sound alike errors within the text.

## 2023-01-09 LAB
ANA PATTERN 1: NORMAL
ANA SER QL IF: POSITIVE
ANA TITR SER IF: NORMAL {TITER}

## 2023-01-10 LAB
B2 GLYCOPROT1 IGA SER QL: <9 SAU
ENA SCL70 AB SER-ACNC: 2 UNITS

## 2023-01-12 LAB
ANTI SM ANTIBODY: 0.09 RATIO (ref 0–0.99)
ANTI SM/RNP ANTIBODY: 0.11 RATIO (ref 0–0.99)
ANTI-SM INTERPRETATION: NEGATIVE
ANTI-SM/RNP INTERPRETATION: NEGATIVE
ANTI-SSA ANTIBODY: 0.1 RATIO (ref 0–0.99)
ANTI-SSA INTERPRETATION: NEGATIVE
ANTI-SSB ANTIBODY: 0.05 RATIO (ref 0–0.99)
ANTI-SSB INTERPRETATION: NEGATIVE
DSDNA AB SER-ACNC: NORMAL [IU]/ML

## 2023-01-14 LAB — RNAP III AB SER-ACNC: <20 UNITS

## 2023-01-30 ENCOUNTER — SPECIALTY PHARMACY (OUTPATIENT)
Dept: PHARMACY | Facility: CLINIC | Age: 58
End: 2023-01-30
Payer: COMMERCIAL

## 2023-01-30 NOTE — TELEPHONE ENCOUNTER
Sildenafil RX refill received. High copay- $386.91- Currently no FA hien open for assistance. LVM for patient to informed along if unaffordable-can obtain under [facility] pharmacy with [facility] ~$13

## 2023-02-02 ENCOUNTER — PATIENT MESSAGE (OUTPATIENT)
Dept: PHARMACY | Facility: CLINIC | Age: 58
End: 2023-02-02
Payer: COMMERCIAL

## 2023-02-10 NOTE — TELEPHONE ENCOUNTER
Patient's reached. Informed of high copay with [employer] with added deductible. Informed of cost with goodrx but patient would like to check with her local pharmacy at [place] on cost.Will update us with where she would like to transfer this due to cost.Okay with callback next week.

## 2023-02-14 NOTE — TELEPHONE ENCOUNTER
Reached patient who states she still has not decided on where she would like [facility] transferred to as she can not afford the cost on her insurance. Pt states that she will call [facility] back once she has decided.

## 2023-02-22 NOTE — TELEPHONE ENCOUNTER
Patient informed receiving sildenafil at [facility] for ~$20 for 3 months. Not eligibile currently with PAH hien with commercial plan. Will continue receiving there. Closing enrollment at [facility].

## 2023-05-05 ENCOUNTER — LAB VISIT (OUTPATIENT)
Dept: LAB | Facility: HOSPITAL | Age: 58
End: 2023-05-05
Attending: PHYSICIAN ASSISTANT
Payer: COMMERCIAL

## 2023-05-05 DIAGNOSIS — M35.9 CONNECTIVE TISSUE DISEASE, UNDIFFERENTIATED: ICD-10-CM

## 2023-05-05 LAB
ALBUMIN SERPL-MCNC: 4.1 G/DL (ref 3.5–5)
ALBUMIN/GLOB SERPL: 1.3 RATIO (ref 1.1–2)
ALP SERPL-CCNC: 117 UNIT/L (ref 40–150)
ALT SERPL-CCNC: 17 UNIT/L (ref 0–55)
AST SERPL-CCNC: 22 UNIT/L (ref 5–34)
BASOPHILS # BLD AUTO: 0.04 X10(3)/MCL
BASOPHILS NFR BLD AUTO: 1.2 %
BILIRUBIN DIRECT+TOT PNL SERPL-MCNC: 0.4 MG/DL
BUN SERPL-MCNC: 29 MG/DL (ref 9.8–20.1)
CALCIUM SERPL-MCNC: 9 MG/DL (ref 8.4–10.2)
CHLORIDE SERPL-SCNC: 110 MMOL/L (ref 98–107)
CO2 SERPL-SCNC: 22 MMOL/L (ref 22–29)
CREAT SERPL-MCNC: 0.86 MG/DL (ref 0.55–1.02)
CRP SERPL-MCNC: 1.4 MG/L
EOSINOPHIL # BLD AUTO: 0.09 X10(3)/MCL (ref 0–0.9)
EOSINOPHIL NFR BLD AUTO: 2.7 %
ERYTHROCYTE [DISTWIDTH] IN BLOOD BY AUTOMATED COUNT: 13 % (ref 11.5–17)
ERYTHROCYTE [SEDIMENTATION RATE] IN BLOOD: 4 MM/HR (ref 0–20)
GFR SERPLBLD CREATININE-BSD FMLA CKD-EPI: >60 MLS/MIN/1.73/M2
GLOBULIN SER-MCNC: 3.1 GM/DL (ref 2.4–3.5)
GLUCOSE SERPL-MCNC: 103 MG/DL (ref 74–100)
HCT VFR BLD AUTO: 35.9 % (ref 37–47)
HGB BLD-MCNC: 12.2 G/DL (ref 12–16)
IMM GRANULOCYTES # BLD AUTO: 0.01 X10(3)/MCL (ref 0–0.04)
IMM GRANULOCYTES NFR BLD AUTO: 0.3 %
LYMPHOCYTES # BLD AUTO: 1.39 X10(3)/MCL (ref 0.6–4.6)
LYMPHOCYTES NFR BLD AUTO: 42.1 %
MCH RBC QN AUTO: 29 PG (ref 27–31)
MCHC RBC AUTO-ENTMCNC: 34 G/DL (ref 33–36)
MCV RBC AUTO: 85.3 FL (ref 80–94)
MONOCYTES # BLD AUTO: 0.39 X10(3)/MCL (ref 0.1–1.3)
MONOCYTES NFR BLD AUTO: 11.8 %
NEUTROPHILS # BLD AUTO: 1.38 X10(3)/MCL (ref 2.1–9.2)
NEUTROPHILS NFR BLD AUTO: 41.9 %
NRBC BLD AUTO-RTO: 0 %
PLATELET # BLD AUTO: 162 X10(3)/MCL (ref 130–400)
PMV BLD AUTO: 10.2 FL (ref 7.4–10.4)
POTASSIUM SERPL-SCNC: 4.7 MMOL/L (ref 3.5–5.1)
PROT SERPL-MCNC: 7.2 GM/DL (ref 6.4–8.3)
RBC # BLD AUTO: 4.21 X10(6)/MCL (ref 4.2–5.4)
SODIUM SERPL-SCNC: 138 MMOL/L (ref 136–145)
WBC # SPEC AUTO: 3.3 X10(3)/MCL (ref 4.5–11.5)

## 2023-05-05 PROCEDURE — 85025 COMPLETE CBC W/AUTO DIFF WBC: CPT

## 2023-05-05 PROCEDURE — 80053 COMPREHEN METABOLIC PANEL: CPT

## 2023-05-05 PROCEDURE — 85651 RBC SED RATE NONAUTOMATED: CPT

## 2023-05-05 PROCEDURE — 36415 COLL VENOUS BLD VENIPUNCTURE: CPT

## 2023-05-05 PROCEDURE — 86140 C-REACTIVE PROTEIN: CPT

## 2023-05-15 DIAGNOSIS — M34.1 CREST SYNDROME: ICD-10-CM

## 2023-05-15 DIAGNOSIS — M35.9 CONNECTIVE TISSUE DISEASE, UNDIFFERENTIATED: ICD-10-CM

## 2023-05-15 RX ORDER — HYDROXYCHLOROQUINE SULFATE 200 MG/1
400 TABLET, FILM COATED ORAL DAILY
Qty: 180 TABLET | Refills: 0 | Status: SHIPPED | OUTPATIENT
Start: 2023-05-15 | End: 2023-07-21 | Stop reason: SDUPTHER

## 2023-07-21 ENCOUNTER — OFFICE VISIT (OUTPATIENT)
Dept: RHEUMATOLOGY | Facility: CLINIC | Age: 58
End: 2023-07-21
Payer: COMMERCIAL

## 2023-07-21 VITALS
HEIGHT: 67 IN | WEIGHT: 215.63 LBS | HEART RATE: 46 BPM | SYSTOLIC BLOOD PRESSURE: 108 MMHG | DIASTOLIC BLOOD PRESSURE: 64 MMHG | BODY MASS INDEX: 33.84 KG/M2

## 2023-07-21 DIAGNOSIS — M35.9 CONNECTIVE TISSUE DISEASE, UNDIFFERENTIATED: ICD-10-CM

## 2023-07-21 DIAGNOSIS — R76.8 POSITIVE ANA (ANTINUCLEAR ANTIBODY): Primary | ICD-10-CM

## 2023-07-21 DIAGNOSIS — M34.1 CREST SYNDROME: ICD-10-CM

## 2023-07-21 PROCEDURE — 99214 PR OFFICE/OUTPT VISIT, EST, LEVL IV, 30-39 MIN: ICD-10-PCS | Mod: S$GLB,,, | Performed by: PHYSICIAN ASSISTANT

## 2023-07-21 PROCEDURE — 4010F ACE/ARB THERAPY RXD/TAKEN: CPT | Mod: CPTII,S$GLB,, | Performed by: PHYSICIAN ASSISTANT

## 2023-07-21 PROCEDURE — 1159F MED LIST DOCD IN RCRD: CPT | Mod: CPTII,S$GLB,, | Performed by: PHYSICIAN ASSISTANT

## 2023-07-21 PROCEDURE — 3078F DIAST BP <80 MM HG: CPT | Mod: CPTII,S$GLB,, | Performed by: PHYSICIAN ASSISTANT

## 2023-07-21 PROCEDURE — 99214 OFFICE O/P EST MOD 30 MIN: CPT | Mod: S$GLB,,, | Performed by: PHYSICIAN ASSISTANT

## 2023-07-21 PROCEDURE — 1159F PR MEDICATION LIST DOCUMENTED IN MEDICAL RECORD: ICD-10-PCS | Mod: CPTII,S$GLB,, | Performed by: PHYSICIAN ASSISTANT

## 2023-07-21 PROCEDURE — 3074F SYST BP LT 130 MM HG: CPT | Mod: CPTII,S$GLB,, | Performed by: PHYSICIAN ASSISTANT

## 2023-07-21 PROCEDURE — 1160F RVW MEDS BY RX/DR IN RCRD: CPT | Mod: CPTII,S$GLB,, | Performed by: PHYSICIAN ASSISTANT

## 2023-07-21 PROCEDURE — 3008F BODY MASS INDEX DOCD: CPT | Mod: CPTII,S$GLB,, | Performed by: PHYSICIAN ASSISTANT

## 2023-07-21 PROCEDURE — 99999 PR PBB SHADOW E&M-EST. PATIENT-LVL IV: ICD-10-PCS | Mod: PBBFAC,,, | Performed by: PHYSICIAN ASSISTANT

## 2023-07-21 PROCEDURE — 99999 PR PBB SHADOW E&M-EST. PATIENT-LVL IV: CPT | Mod: PBBFAC,,, | Performed by: PHYSICIAN ASSISTANT

## 2023-07-21 PROCEDURE — 4010F PR ACE/ARB THEARPY RXD/TAKEN: ICD-10-PCS | Mod: CPTII,S$GLB,, | Performed by: PHYSICIAN ASSISTANT

## 2023-07-21 PROCEDURE — 1160F PR REVIEW ALL MEDS BY PRESCRIBER/CLIN PHARMACIST DOCUMENTED: ICD-10-PCS | Mod: CPTII,S$GLB,, | Performed by: PHYSICIAN ASSISTANT

## 2023-07-21 PROCEDURE — 3008F PR BODY MASS INDEX (BMI) DOCUMENTED: ICD-10-PCS | Mod: CPTII,S$GLB,, | Performed by: PHYSICIAN ASSISTANT

## 2023-07-21 PROCEDURE — 3078F PR MOST RECENT DIASTOLIC BLOOD PRESSURE < 80 MM HG: ICD-10-PCS | Mod: CPTII,S$GLB,, | Performed by: PHYSICIAN ASSISTANT

## 2023-07-21 PROCEDURE — 3074F PR MOST RECENT SYSTOLIC BLOOD PRESSURE < 130 MM HG: ICD-10-PCS | Mod: CPTII,S$GLB,, | Performed by: PHYSICIAN ASSISTANT

## 2023-07-21 RX ORDER — HYDROXYCHLOROQUINE SULFATE 200 MG/1
400 TABLET, FILM COATED ORAL DAILY
Qty: 180 TABLET | Refills: 0 | Status: SHIPPED | OUTPATIENT
Start: 2023-07-21 | End: 2023-11-27 | Stop reason: SDUPTHER

## 2023-07-21 NOTE — PROGRESS NOTES
Subjective:      Patient ID: Candie Gamboa is a 58 y.o. female.    Chief Complaint: Disease Management      HPI   Candie Gamboa  is a 58 y.o. female seen for f/u MCTD as well as CREST syndrome.  She is on Plaquenil 400 mg daily.  Raynaud's is well controlled.  Reflux is also well controlled.  She tells me she has a limited scleroderma picture.  Has been diagnosed w hypergammablobulinemia.  She follows with Hematology Oncology at the [facility].  She is also since been diagnosed with pulmonary hypertension.  She sees a pulmonologist in [place] as well as at the [facility].  She also follows with [facility] for cardiology concerns.    Was on Imuran in past for joint pain.  It helped her symptoms, but with developing leukopenia, it was DCed. Joints stable off imuran, but still w leukopenia.  On HCQ > 10 yrs.  Last eye exam was fall 2022 per patient report.  She says there was no evidence of plaq related maculopathy at that time.    Seeing [facility] for Stage 2 CKD.    No joint pain.  0/10.  She denies prolonged morning stiffness.  Denies tender swollen joints as well as weakness.  No lymphadenopathy.  Pulmonary function is stable per report.    Patient denies fevers, chills, photosensitivity, eye pain, shortness of breath, chest pain, hematuria, blood in the stool, rash, sicca symptoms, raynauds, finger ulcerations.  Rheumatologic systems otherwise negative.    Serologies/Labs:  +EMILIANO 1:320 centromere, neg profile - 2016  Current Treatment:  Plaquenil 400 mg daily - 10+ years of tx  Previous Treatment:   Imuran 100 mg daily      Current Outpatient Medications:     amLODIPine (NORVASC) 10 MG tablet, Take 1 tablet by mouth once daily., Disp: , Rfl:     ascorbic acid, vitamin C, (VITAMIN C) 1000 MG tablet, Take 1,000 mg by mouth 2 (two) times daily., Disp: , Rfl:     aspirin (ECOTRIN) 81 MG EC tablet, 1 tablet., Disp: , Rfl:     DOCOSAHEXANOIC ACID/EPA (FISH OIL ORAL), Take  "2,000 mg by mouth 2 (two) times daily., Disp: , Rfl:     esomeprazole (NEXIUM) 40 MG capsule, TAKE ONE CAPSULE BY MOUTH BEFORE BREAKFAST, Disp: 90 capsule, Rfl: 1    fexofenadine (ALLEGRA) 180 MG tablet, 1 tablet as needed., Disp: , Rfl:     fluticasone (FLONASE) 50 mcg/actuation nasal spray, 1 spray by Each Nare route once daily., Disp: , Rfl:     lisinopriL (PRINIVIL,ZESTRIL) 40 MG tablet, 1 tablet., Disp: , Rfl:     macitentan (OPSUMIT) 10 mg Tab, 1 tablet., Disp: , Rfl:     multivitamin (THERAGRAN) per tablet, Take 1 tablet by mouth once daily., Disp: , Rfl:     pravastatin (PRAVACHOL) 40 MG tablet, Take 40 mg by mouth once daily., Disp: , Rfl:     pyridoxine, vitamin B6, 200 mg Tab, Take by mouth., Disp: , Rfl:     sildenafil (REVATIO) 20 mg Tab, Take 1 tablet (20 mg total) by mouth three times daily., Disp: 90 tablet, Rfl: 6    sotaloL (BETAPACE) 80 MG tablet, 1 tablet., Disp: , Rfl:     vitamin E 400 UNIT capsule, Take 400 Units by mouth once daily., Disp: , Rfl:     zinc gluconate 100 mg Tab, 1 tablet., Disp: , Rfl:     hydrOXYchloroQUINE (PLAQUENIL) 200 mg tablet, Take 2 tablets (400 mg total) by mouth once daily., Disp: 180 tablet, Rfl: 0    Past Medical History:   Diagnosis Date    *Atrial fibrillation     Acid reflux     Allergy     Anemia     Anxiety     Dry eyes     Dry mouth     Hyperlipidemia     Hypertension     Sickle cell anemia      Family History   Problem Relation Age of Onset    Hypertension Mother     Rheum arthritis Mother     Cancer Father     Hypertension Sister     Hyperlipidemia Brother      Social History     Socioeconomic History    Marital status:    Tobacco Use    Smoking status: Never   Substance and Sexual Activity    Alcohol use: No    Drug use: No     Review of patient's allergies indicates:  No Known Allergies    Objective:   /64   Pulse (!) 46   Ht 5' 7" (1.702 m)   Wt 97.8 kg (215 lb 9.8 oz)   BMI 33.77 kg/m²   Immunization History   Administered Date(s) " Administered    COVID-19, MRNA, LN-S, PF (Pfizer) (Gray Cap) 04/26/2022    COVID-19, MRNA, LN-S, PF (Pfizer) (Purple Cap) 12/22/2020, 01/12/2021, 09/21/2021    COVID-19, mRNA, LNP-S, bivalent booster, PF (PFIZER OMICRON) 12/29/2022    Hepatitis B, Adult 01/23/2018, 02/27/2018, 07/25/2018    Influenza - Intradermal - Trivalent - PF 10/24/2012    Influenza - Quadrivalent - PF *Preferred* (6 months and older) 10/03/2017, 10/02/2018, 10/09/2019, 10/07/2020, 11/17/2021, 10/31/2022    Influenza - Trivalent (ADULT) 09/22/2011    Measles 05/12/1969    Measles / Rubella 03/26/1976    Pneumococcal Conjugate - 13 Valent 01/20/2014, 01/20/2014    Pneumococcal Polysaccharide - 23 Valent 09/25/2017, 11/12/2018    Poliovirus 05/03/1969, 09/18/1969, 08/03/1972, 03/26/1976    Td (Adult), Unspecified Formulation 1967, 05/03/1969, 06/20/1969, 08/03/1972, 01/17/1977    Zoster Recombinant 10/07/2021, 12/08/2021       Physical Exam   Constitutional: She is oriented to person, place, and time. No distress.   HENT:   Head: Normocephalic and atraumatic.   Pulmonary/Chest: Effort normal.   Abdominal: She exhibits no distension.   Musculoskeletal:         General: No swelling or tenderness. Normal range of motion.      Cervical back: Normal range of motion.   Lymphadenopathy:     She has no cervical adenopathy.   Neurological: She is alert and oriented to person, place, and time.   Skin: Skin is warm and dry. No rash noted.   Psychiatric: Mood normal.   Nursing note and vitals reviewed.    No synovitis, no dactylitis  No digital ulcerations  Normal nailfold capillary    No results found for this or any previous visit (from the past 672 hour(s)).    No results found for: TBGOLDPLUS   No results found for: HAV, HEPAIGM, HEPBIGM, HEPBCAB, HBEAG, HEPCAB       Assessment:     1. Positive EMILIANO (antinuclear antibody)    2. Connective tissue disease, undifferentiated    3. CREST syndrome            Plan:     Candie was seen today for disease  management.    Diagnoses and all orders for this visit:    Positive EMILIANO (antinuclear antibody)  -     Anti-DNA Ab, Double-Stranded; Standing  -     Protein/Creatinine Ratio, Urine; Standing  -     C4 Complement; Standing  -     C3 Complement; Standing    Connective tissue disease, undifferentiated  -     hydrOXYchloroQUINE (PLAQUENIL) 200 mg tablet; Take 2 tablets (400 mg total) by mouth once daily.    CREST syndrome  -     hydrOXYchloroQUINE (PLAQUENIL) 200 mg tablet; Take 2 tablets (400 mg total) by mouth once daily.        MCTD w overlapping CREST syndrome  Labs reviewed  Leukopenia noted  O/w stable  Plaquenil 400 mg daily  No maculopathy fall 2022 per pt report  Pt aware of importance for yearly eye exams to monitor for Plaquenil related toxicity, especially given chronicity of her therapy.  Will call to schedule next exam  Pulm HTN  Follows w cards and pulm  Pulm function stable  No CP/SOB  Mgmt per pulm/cards  Raynauds  No prolonged attacks  No digital ulcerations  Pt to notify me if sxs change  GERD controlled on nexium  Hypergammaglobulinemia  Mgmt per hematology  Drug therapy requiring intensive monitoring for toxicity  High Risk Medication Monitoring encounter  No current medication related issues, no evidence of toxicity  I ordered labs for toxicity monitoring, have personally reviewed the findings, and discussed them with the patient.  Pending labs will be sent via the portal  Compromised immune system secondary to autoimmune disease and/or use of immunosuppressive drugs.  Monitor carefully for infections.  Advised patient to get immediate medical care if any infection arises.  Also advised strict adherence age-appropriate vaccinations and cancer screenings with PCP.  Patient advised to hold DMARD and/or biologic therapy for signs of infection or for surgery. If you are unsure what to do please call our office for instruction.[facility] [phone]  Return to clinic: 4 mos w reg 4  prior    Follow up in about 4 months (around 11/21/2023).    The patient understands, chooses and consents to this plan and accepts all the risks which include but are not limited to the risks mentioned above.     Disclaimer: This note was prepared using a voice recognition system and is likely to have sound alike errors within the text.

## 2023-09-08 NOTE — TELEPHONE ENCOUNTER
Outgoing to [facility] -CLAUDIA bender will fax over to [facility] lab.chart note for PA  pending submission via [facility]- (Key: [id number])   Confucianism -> /yes Yarsani -> /yes Anabaptist -> /yes

## 2023-11-17 ENCOUNTER — LAB VISIT (OUTPATIENT)
Dept: LAB | Facility: HOSPITAL | Age: 58
End: 2023-11-17
Attending: PHYSICIAN ASSISTANT
Payer: COMMERCIAL

## 2023-11-17 DIAGNOSIS — M35.9 CONNECTIVE TISSUE DISEASE, UNDIFFERENTIATED: ICD-10-CM

## 2023-11-17 DIAGNOSIS — R76.8 POSITIVE ANA (ANTINUCLEAR ANTIBODY): ICD-10-CM

## 2023-11-17 LAB
ALBUMIN SERPL-MCNC: 4.2 G/DL (ref 3.5–5)
ALBUMIN/GLOB SERPL: 1.5 RATIO (ref 1.1–2)
ALP SERPL-CCNC: 103 UNIT/L (ref 40–150)
ALT SERPL-CCNC: 13 UNIT/L (ref 0–55)
AST SERPL-CCNC: 22 UNIT/L (ref 5–34)
BASOPHILS # BLD AUTO: 0.03 X10(3)/MCL
BASOPHILS NFR BLD AUTO: 1 %
BILIRUB SERPL-MCNC: 0.5 MG/DL
BUN SERPL-MCNC: 24.9 MG/DL (ref 9.8–20.1)
C3 SERPL-MCNC: 100 MG/DL (ref 80–173)
C4 SERPL-MCNC: 28 MG/DL (ref 13–46)
CALCIUM SERPL-MCNC: 9.1 MG/DL (ref 8.4–10.2)
CHLORIDE SERPL-SCNC: 107 MMOL/L (ref 98–107)
CO2 SERPL-SCNC: 23 MMOL/L (ref 22–29)
CREAT SERPL-MCNC: 0.87 MG/DL (ref 0.55–1.02)
CREAT UR-MCNC: 51.5 MG/DL (ref 45–106)
CRP SERPL-MCNC: <1 MG/L
EOSINOPHIL # BLD AUTO: 0.08 X10(3)/MCL (ref 0–0.9)
EOSINOPHIL NFR BLD AUTO: 2.7 %
ERYTHROCYTE [DISTWIDTH] IN BLOOD BY AUTOMATED COUNT: 12.9 % (ref 11.5–17)
ERYTHROCYTE [SEDIMENTATION RATE] IN BLOOD: 3 MM/HR (ref 0–20)
GFR SERPLBLD CREATININE-BSD FMLA CKD-EPI: >60 MLS/MIN/1.73/M2
GLOBULIN SER-MCNC: 2.8 GM/DL (ref 2.4–3.5)
GLUCOSE SERPL-MCNC: 91 MG/DL (ref 74–100)
HCT VFR BLD AUTO: 37.4 % (ref 37–47)
HGB BLD-MCNC: 12.5 G/DL (ref 12–16)
IMM GRANULOCYTES # BLD AUTO: 0.01 X10(3)/MCL (ref 0–0.04)
IMM GRANULOCYTES NFR BLD AUTO: 0.3 %
LYMPHOCYTES # BLD AUTO: 1.46 X10(3)/MCL (ref 0.6–4.6)
LYMPHOCYTES NFR BLD AUTO: 49.5 %
MCH RBC QN AUTO: 29.2 PG (ref 27–31)
MCHC RBC AUTO-ENTMCNC: 33.4 G/DL (ref 33–36)
MCV RBC AUTO: 87.4 FL (ref 80–94)
MONOCYTES # BLD AUTO: 0.35 X10(3)/MCL (ref 0.1–1.3)
MONOCYTES NFR BLD AUTO: 11.9 %
NEUTROPHILS # BLD AUTO: 1.02 X10(3)/MCL (ref 2.1–9.2)
NEUTROPHILS NFR BLD AUTO: 34.6 %
NRBC BLD AUTO-RTO: 0 %
PLATELET # BLD AUTO: 153 X10(3)/MCL (ref 130–400)
PLATELETS.RETICULATED NFR BLD AUTO: 3.8 % (ref 0.9–11.2)
PMV BLD AUTO: 10.5 FL (ref 7.4–10.4)
POTASSIUM SERPL-SCNC: 4 MMOL/L (ref 3.5–5.1)
PROT SERPL-MCNC: 7 GM/DL (ref 6.4–8.3)
PROT UR STRIP-MCNC: <6.8 MG/DL
RBC # BLD AUTO: 4.28 X10(6)/MCL (ref 4.2–5.4)
SODIUM SERPL-SCNC: 137 MMOL/L (ref 136–145)
WBC # SPEC AUTO: 2.95 X10(3)/MCL (ref 4.5–11.5)

## 2023-11-17 PROCEDURE — 85025 COMPLETE CBC W/AUTO DIFF WBC: CPT

## 2023-11-17 PROCEDURE — 82570 ASSAY OF URINE CREATININE: CPT

## 2023-11-17 PROCEDURE — 86160 COMPLEMENT ANTIGEN: CPT

## 2023-11-17 PROCEDURE — 36415 COLL VENOUS BLD VENIPUNCTURE: CPT

## 2023-11-17 PROCEDURE — 85652 RBC SED RATE AUTOMATED: CPT

## 2023-11-17 PROCEDURE — 80053 COMPREHEN METABOLIC PANEL: CPT

## 2023-11-17 PROCEDURE — 86140 C-REACTIVE PROTEIN: CPT

## 2023-11-17 PROCEDURE — 86225 DNA ANTIBODY NATIVE: CPT

## 2023-11-20 LAB — DSDNA AB QUANT (OHS): 0.9 IU/ML

## 2023-11-27 ENCOUNTER — OFFICE VISIT (OUTPATIENT)
Dept: RHEUMATOLOGY | Facility: CLINIC | Age: 58
End: 2023-11-27
Payer: COMMERCIAL

## 2023-11-27 DIAGNOSIS — Z51.81 MEDICATION MONITORING ENCOUNTER: ICD-10-CM

## 2023-11-27 DIAGNOSIS — Z79.899 LONG-TERM USE OF PLAQUENIL: ICD-10-CM

## 2023-11-27 DIAGNOSIS — M35.9 CONNECTIVE TISSUE DISEASE, UNDIFFERENTIATED: ICD-10-CM

## 2023-11-27 DIAGNOSIS — M34.1 CREST SYNDROME: Primary | ICD-10-CM

## 2023-11-27 DIAGNOSIS — Z79.899 HIGH RISK MEDICATION USE: ICD-10-CM

## 2023-11-27 PROCEDURE — 1159F MED LIST DOCD IN RCRD: CPT | Mod: CPTII,95,, | Performed by: PHYSICIAN ASSISTANT

## 2023-11-27 PROCEDURE — 99214 PR OFFICE/OUTPT VISIT, EST, LEVL IV, 30-39 MIN: ICD-10-PCS | Mod: 95,,, | Performed by: PHYSICIAN ASSISTANT

## 2023-11-27 PROCEDURE — 1160F RVW MEDS BY RX/DR IN RCRD: CPT | Mod: CPTII,95,, | Performed by: PHYSICIAN ASSISTANT

## 2023-11-27 PROCEDURE — 4010F PR ACE/ARB THEARPY RXD/TAKEN: ICD-10-PCS | Mod: CPTII,95,, | Performed by: PHYSICIAN ASSISTANT

## 2023-11-27 PROCEDURE — 1159F PR MEDICATION LIST DOCUMENTED IN MEDICAL RECORD: ICD-10-PCS | Mod: CPTII,95,, | Performed by: PHYSICIAN ASSISTANT

## 2023-11-27 PROCEDURE — 4010F ACE/ARB THERAPY RXD/TAKEN: CPT | Mod: CPTII,95,, | Performed by: PHYSICIAN ASSISTANT

## 2023-11-27 PROCEDURE — 99214 OFFICE O/P EST MOD 30 MIN: CPT | Mod: 95,,, | Performed by: PHYSICIAN ASSISTANT

## 2023-11-27 PROCEDURE — 1160F PR REVIEW ALL MEDS BY PRESCRIBER/CLIN PHARMACIST DOCUMENTED: ICD-10-PCS | Mod: CPTII,95,, | Performed by: PHYSICIAN ASSISTANT

## 2023-11-27 RX ORDER — HYDROXYCHLOROQUINE SULFATE 200 MG/1
400 TABLET, FILM COATED ORAL DAILY
Qty: 180 TABLET | Refills: 0 | Status: SHIPPED | OUTPATIENT
Start: 2023-11-27

## 2023-11-27 NOTE — PROGRESS NOTES
The patient location is: home  The chief complaint leading to consultation is: f/u UCTD and CREST    Visit type: audiovisual    Face to Face time with patient: 11 min  15 minutes of total time spent on the encounter, which includes face to face time and non-face to face time preparing to see the patient (eg, review of tests), Obtaining and/or reviewing separately obtained history, Documenting clinical information in the electronic or other health record, Independently interpreting results (not separately reported) and communicating results to the patient/family/caregiver, or Care coordination (not separately reported).     Each patient to whom he or she provides medical services by telemedicine is:  (1) informed of the relationship between the physician and patient and the respective role of any other health care provider with respect to management of the patient; and (2) notified that he or she may decline to receive medical services by telemedicine and may withdraw from such care at any time.    Subjective:      Patient ID: Candie Gamboa is a 58 y.o. female.    Chief Complaint: No chief complaint on file.    HPI   Candie Gamboa  is a 58 y.o. female seen for f/u UCTD as well as CREST syndrome.  She is on Plaquenil 400 mg daily.  Raynaud's and reflux both well controlled.  She has backed down on Nexium to every other day due to weight loss.  She  has a limited scleroderma picture.  She follows with Hematology Oncology at the [facility] for hypergammaglobulinemia as well as leukopenia.  Also has pulmonary hypertension.  She sees a pulmonologist in [place] as well as at the [facility].  She also follows with [facility] for cardiology concerns.  They are in the process of adjusting her medications for blood pressure due to it running low.    Was on Imuran in past for joint pain.  It helped her symptoms, but with developing leukopenia, it was DCed. Joints stable off imuran, but  still w leukopenia.  On HCQ > 10 yrs.  Last eye exam was fall 2022 per patient report.  She says there was no evidence of plaq related maculopathy at that time.  Planning to get her next eye exam in about 1-2 months, once her new insurance takes affect in the new year.    Seeing [facility] for Stage 2 CKD.    No joint pain.  0/10.  She denies prolonged morning stiffness.  Denies tender swollen joints as well as weakness.  No lymphadenopathy.  Pulmonary function is stable per report.  N    Patient denies fevers, chills, photosensitivity, eye pain, shortness of breath, chest pain, hematuria, blood in the stool, rash, sicca symptoms, raynauds, finger ulcerations.  Rheumatologic systems otherwise negative.    Serologies/Labs:  +EMILIANO 1:320 centromere, neg profile - 2016  Current Treatment:  Plaquenil 400 mg daily - 10+ years of tx  ASA 81 mg daily  Nexium 40 mg qod - backing off d/t weight loss  Amlodipine 10 mg daily  Pravastatin 40 mg daily  Revatio 20 mg tid  Previous Treatment:   Imuran 100 mg daily    Current Outpatient Medications:     amLODIPine (NORVASC) 10 MG tablet, Take 1 tablet by mouth once daily., Disp: , Rfl:     ascorbic acid, vitamin C, (VITAMIN C) 1000 MG tablet, Take 1,000 mg by mouth 2 (two) times daily., Disp: , Rfl:     aspirin (ECOTRIN) 81 MG EC tablet, 1 tablet., Disp: , Rfl:     DOCOSAHEXANOIC ACID/EPA (FISH OIL ORAL), Take 2,000 mg by mouth 2 (two) times daily., Disp: , Rfl:     esomeprazole (NEXIUM) 40 MG capsule, TAKE ONE CAPSULE BY MOUTH BEFORE BREAKFAST, Disp: 90 capsule, Rfl: 1    fexofenadine (ALLEGRA) 180 MG tablet, 1 tablet as needed., Disp: , Rfl:     fluticasone (FLONASE) 50 mcg/actuation nasal spray, 1 spray by Each Nare route once daily., Disp: , Rfl:     hydroxychloroquine (PLAQUENIL) 200 mg tablet, Take 2 tablets (400 mg total) by mouth once daily., Disp: 180 tablet, Rfl: 0    lisinopriL (PRINIVIL,ZESTRIL) 40 MG tablet, 1 tablet., Disp: , Rfl:     macitentan (OPSUMIT) 10 mg  Tab, 1 tablet., Disp: , Rfl:     multivitamin (THERAGRAN) per tablet, Take 1 tablet by mouth once daily., Disp: , Rfl:     pravastatin (PRAVACHOL) 40 MG tablet, Take 40 mg by mouth once daily., Disp: , Rfl:     pyridoxine, vitamin B6, 200 mg Tab, Take by mouth., Disp: , Rfl:     sildenafil (REVATIO) 20 mg Tab, Take 1 tablet (20 mg total) by mouth three times daily., Disp: 90 tablet, Rfl: 6    sotaloL (BETAPACE) 80 MG tablet, 1 tablet., Disp: , Rfl:     vitamin E 400 UNIT capsule, Take 400 Units by mouth once daily., Disp: , Rfl:     zinc gluconate 100 mg Tab, 1 tablet., Disp: , Rfl:     Past Medical History:   Diagnosis Date    *Atrial fibrillation     Acid reflux     Allergy     Anemia     Anxiety     Dry eyes     Dry mouth     Hyperlipidemia     Hypertension     Sickle cell anemia      Family History   Problem Relation Age of Onset    Hypertension Mother     Rheum arthritis Mother     Cancer Father     Hypertension Sister     Hyperlipidemia Brother      Social History     Socioeconomic History    Marital status:    Tobacco Use    Smoking status: Never   Substance and Sexual Activity    Alcohol use: No    Drug use: No     Review of patient's allergies indicates:  No Known Allergies    Objective:   There were no vitals taken for this visit.  Immunization History   Administered Date(s) Administered    COVID-19, MRNA, LN-S, PF (Pfizer) (Gray Cap) 04/26/2022    COVID-19, MRNA, LN-S, PF (Pfizer) (Purple Cap) 12/22/2020, 01/12/2021, 09/21/2021    COVID-19, mRNA, LNP-S, bivalent booster, PF (PFIZER OMICRON) 12/29/2022    Hepatitis B, Adult 01/23/2018, 02/27/2018, 07/25/2018    Influenza - Intradermal - Trivalent - PF 10/24/2012    Influenza - Quadrivalent - PF *Preferred* (6 months and older) 10/03/2017, 10/02/2018, 10/09/2019, 10/07/2020, 11/17/2021, 10/31/2022    Influenza - Trivalent (ADULT) 09/22/2011    Measles 05/12/1969    Measles / Rubella 03/26/1976    Pneumococcal Conjugate - 13 Valent 01/20/2014,  01/20/2014    Pneumococcal Polysaccharide - 23 Valent 09/25/2017, 11/12/2018    Poliovirus 05/03/1969, 09/18/1969, 08/03/1972, 03/26/1976    Td (Adult), Unspecified Formulation 1967, 05/03/1969, 06/20/1969, 08/03/1972, 01/17/1977    Zoster Recombinant 10/07/2021, 12/08/2021       Physical Exam   Constitutional: She is oriented to person, place, and time. No distress.   HENT:   Head: Normocephalic and atraumatic.   Pulmonary/Chest: Effort normal.   Musculoskeletal:      Cervical back: Normal range of motion.   Neurological: She is alert and oriented to person, place, and time.   Psychiatric: Mood normal.     Recent Results (from the past 672 hour(s))   Comprehensive Metabolic Panel    Collection Time: 11/17/23  2:45 PM   Result Value Ref Range    Sodium Level 137 136 - 145 mmol/L    Potassium Level 4.0 3.5 - 5.1 mmol/L    Chloride 107 98 - 107 mmol/L    Carbon Dioxide 23 22 - 29 mmol/L    Glucose Level 91 74 - 100 mg/dL    Blood Urea Nitrogen 24.9 (H) 9.8 - 20.1 mg/dL    Creatinine 0.87 0.55 - 1.02 mg/dL    Calcium Level Total 9.1 8.4 - 10.2 mg/dL    Protein Total 7.0 6.4 - 8.3 gm/dL    Albumin Level 4.2 3.5 - 5.0 g/dL    Globulin 2.8 2.4 - 3.5 gm/dL    Albumin/Globulin Ratio 1.5 1.1 - 2.0 ratio    Bilirubin Total 0.5 <=1.5 mg/dL    Alkaline Phosphatase 103 40 - 150 unit/L    Alanine Aminotransferase 13 0 - 55 unit/L    Aspartate Aminotransferase 22 5 - 34 unit/L    eGFR >60 mls/min/1.73/m2   Sedimentation rate    Collection Time: 11/17/23  2:45 PM   Result Value Ref Range    Sed Rate 3 0 - 20 mm/hr   C-Reactive Protein    Collection Time: 11/17/23  2:45 PM   Result Value Ref Range    C-Reactive Protein <1.00 <5.00 mg/L   Protein/Creatinine Ratio, Urine    Collection Time: 11/17/23  2:45 PM   Result Value Ref Range    Urine Protein Level <6.8 mg/dL    Urine Creatinine 51.5 45.0 - 106.0 mg/dL   C4 Complement    Collection Time: 11/17/23  2:45 PM   Result Value Ref Range    C4 Complement 28.0 13.0 - 46.0 mg/dL  "  C3 Complement    Collection Time: 11/17/23  2:45 PM   Result Value Ref Range    C3 Complement 100 80 - 173 mg/dL   CBC with Differential    Collection Time: 11/17/23  2:45 PM   Result Value Ref Range    WBC 2.95 (L) 4.50 - 11.50 x10(3)/mcL    RBC 4.28 4.20 - 5.40 x10(6)/mcL    Hgb 12.5 12.0 - 16.0 g/dL    Hct 37.4 37.0 - 47.0 %    MCV 87.4 80.0 - 94.0 fL    MCH 29.2 27.0 - 31.0 pg    MCHC 33.4 33.0 - 36.0 g/dL    RDW 12.9 11.5 - 17.0 %    Platelet 153 130 - 400 x10(3)/mcL    MPV 10.5 (H) 7.4 - 10.4 fL    Neut % 34.6 %    Lymph % 49.5 %    Mono % 11.9 %    Eos % 2.7 %    Basophil % 1.0 %    Lymph # 1.46 0.6 - 4.6 x10(3)/mcL    Neut # 1.02 (L) 2.1 - 9.2 x10(3)/mcL    Mono # 0.35 0.1 - 1.3 x10(3)/mcL    Eos # 0.08 0 - 0.9 x10(3)/mcL    Baso # 0.03 <=0.2 x10(3)/mcL    IG# 0.01 0 - 0.04 x10(3)/mcL    IG% 0.3 %    NRBC% 0.0 %    IPF 3.8 0.9 - 11.2 %   DSDNA    Collection Time: 11/17/23  2:45 PM   Result Value Ref Range    DSDNA Ab Quant 0.9 <10.0 IU/mL       No results found for: "TBGOLDPLUS"   No results found for: "HAV", "HEPAIGM", "HEPBIGM", "HEPBCAB", "HBEAG", "HEPCAB"       Assessment:     1. CREST syndrome    2. Connective tissue disease, undifferentiated    3. High risk medication use    4. Long-term use of Plaquenil    5. Medication monitoring encounter          Plan:     Diagnoses and all orders for this visit:    CREST syndrome  -     hydroxychloroquine (PLAQUENIL) 200 mg tablet; Take 2 tablets (400 mg total) by mouth once daily.    Connective tissue disease, undifferentiated  -     hydroxychloroquine (PLAQUENIL) 200 mg tablet; Take 2 tablets (400 mg total) by mouth once daily.    High risk medication use    Long-term use of Plaquenil    Medication monitoring encounter      MCTD w overlapping CREST syndrome  Labs reviewed  Leukopenia noted  O/w stable  Plaquenil 400 mg daily  No maculopathy fall 2022 per pt report  Pt aware of importance for yearly eye exams to monitor for Plaquenil related toxicity, " especially given chronicity of her therapy.  Will call to schedule next exam - planning for Jan when new vision insurance goes into effect  Consider dose reduction at f/u if stable  Slight possibility it can contribute to leukopenia  Recent ANC 1.2.  Will conitnue to monitor ANC  Pulm HTN  Follows w cards and pulm  Pulm function stable  No CP/SOB  Mgmt per pulm/cards  Raynauds  No prolonged attacks  No digital ulcerations  Pt to notify me if sxs change  Stable on ASA 81 mg daily  Cards adjusting Norvasc based on BP  Revation ordered  Also on pravastatin  GERD controlled on nexium  Hypergammaglobulinemia  Mgmt per hematology  Drug therapy requiring intensive monitoring for toxicity  High Risk Medication Monitoring encounter  No current medication related issues, no evidence of toxicity  I ordered labs for toxicity monitoring, have personally reviewed the findings, and discussed them with the patient.  Pending labs will be sent via the portal  Compromised immune system secondary to autoimmune disease and/or use of immunosuppressive drugs.  Monitor carefully for infections.  Advised patient to get immediate medical care if any infection arises.  Also advised strict adherence age-appropriate vaccinations and cancer screenings with PCP.  Patient advised to hold DMARD and/or biologic therapy for signs of infection or for surgery. If you are unsure what to do please call our office for instruction.[facility] [phone]  Return to clinic: 4 mos w SLE prior    Follow up in about 4 months (around 3/27/2024).    The patient understands, chooses and consents to this plan and accepts all the risks which include but are not limited to the risks mentioned above.     Disclaimer: This note was prepared using a voice recognition system and is likely to have sound alike errors within the text.

## 2023-11-27 NOTE — PATIENT INSTRUCTIONS
Biotene products (OTC) for dry mouth in addition to xylimelts.  Refresh or systane drops for the dry eyes as needed.

## 2023-11-27 NOTE — Clinical Note
F/u me in 4 mos w SLE labs one week prior if able, please.  Thani you! Ana Consent: Written consent was obtained and risks were reviewed including but not limited to scarring, infection, bleeding, scabbing, incomplete removal, nerve damage and allergy to anesthesia.

## 2024-02-21 DIAGNOSIS — M34.1 CREST SYNDROME: Primary | ICD-10-CM

## 2024-03-22 ENCOUNTER — LAB VISIT (OUTPATIENT)
Dept: LAB | Facility: HOSPITAL | Age: 59
End: 2024-03-22
Attending: PHYSICIAN ASSISTANT
Payer: COMMERCIAL

## 2024-03-22 DIAGNOSIS — M35.9 CONNECTIVE TISSUE DISEASE, UNDIFFERENTIATED: ICD-10-CM

## 2024-03-22 DIAGNOSIS — R76.8 POSITIVE ANA (ANTINUCLEAR ANTIBODY): ICD-10-CM

## 2024-03-22 LAB
ALBUMIN SERPL-MCNC: 4.2 G/DL (ref 3.5–5)
ALBUMIN/GLOB SERPL: 1.5 RATIO (ref 1.1–2)
ALP SERPL-CCNC: 78 UNIT/L (ref 40–150)
ALT SERPL-CCNC: 16 UNIT/L (ref 0–55)
AST SERPL-CCNC: 25 UNIT/L (ref 5–34)
BASOPHILS # BLD AUTO: 0.03 X10(3)/MCL
BASOPHILS NFR BLD AUTO: 1 %
BILIRUB SERPL-MCNC: 0.6 MG/DL
BUN SERPL-MCNC: 31.5 MG/DL (ref 9.8–20.1)
C3 SERPL-MCNC: 97 MG/DL (ref 80–173)
C4 SERPL-MCNC: 31 MG/DL (ref 13–46)
CALCIUM SERPL-MCNC: 9.2 MG/DL (ref 8.4–10.2)
CHLORIDE SERPL-SCNC: 108 MMOL/L (ref 98–107)
CO2 SERPL-SCNC: 22 MMOL/L (ref 22–29)
CREAT SERPL-MCNC: 0.99 MG/DL (ref 0.55–1.02)
CREAT UR-MCNC: 88.7 MG/DL (ref 45–106)
CRP SERPL-MCNC: 1 MG/L
EOSINOPHIL # BLD AUTO: 0.13 X10(3)/MCL (ref 0–0.9)
EOSINOPHIL NFR BLD AUTO: 4.3 %
ERYTHROCYTE [DISTWIDTH] IN BLOOD BY AUTOMATED COUNT: 13 % (ref 11.5–17)
ERYTHROCYTE [SEDIMENTATION RATE] IN BLOOD: 3 MM/HR (ref 0–20)
GFR SERPLBLD CREATININE-BSD FMLA CKD-EPI: >60 MLS/MIN/1.73/M2
GLOBULIN SER-MCNC: 2.8 GM/DL (ref 2.4–3.5)
GLUCOSE SERPL-MCNC: 95 MG/DL (ref 74–100)
HCT VFR BLD AUTO: 33.1 % (ref 37–47)
HGB BLD-MCNC: 11.7 G/DL (ref 12–16)
IMM GRANULOCYTES # BLD AUTO: 0.01 X10(3)/MCL (ref 0–0.04)
IMM GRANULOCYTES NFR BLD AUTO: 0.3 %
LYMPHOCYTES # BLD AUTO: 1.36 X10(3)/MCL (ref 0.6–4.6)
LYMPHOCYTES NFR BLD AUTO: 44.9 %
MCH RBC QN AUTO: 29.5 PG (ref 27–31)
MCHC RBC AUTO-ENTMCNC: 35.3 G/DL (ref 33–36)
MCV RBC AUTO: 83.6 FL (ref 80–94)
MONOCYTES # BLD AUTO: 0.33 X10(3)/MCL (ref 0.1–1.3)
MONOCYTES NFR BLD AUTO: 10.9 %
NEUTROPHILS # BLD AUTO: 1.17 X10(3)/MCL (ref 2.1–9.2)
NEUTROPHILS NFR BLD AUTO: 38.6 %
NRBC BLD AUTO-RTO: 0 %
PLATELET # BLD AUTO: 154 X10(3)/MCL (ref 130–400)
PMV BLD AUTO: 10.3 FL (ref 7.4–10.4)
POTASSIUM SERPL-SCNC: 4.4 MMOL/L (ref 3.5–5.1)
PROT SERPL-MCNC: 7 GM/DL (ref 6.4–8.3)
PROT UR STRIP-MCNC: <6.8 MG/DL
RBC # BLD AUTO: 3.96 X10(6)/MCL (ref 4.2–5.4)
SODIUM SERPL-SCNC: 139 MMOL/L (ref 136–145)
WBC # SPEC AUTO: 3.03 X10(3)/MCL (ref 4.5–11.5)

## 2024-03-22 PROCEDURE — 86225 DNA ANTIBODY NATIVE: CPT

## 2024-03-22 PROCEDURE — 80053 COMPREHEN METABOLIC PANEL: CPT

## 2024-03-22 PROCEDURE — 86160 COMPLEMENT ANTIGEN: CPT

## 2024-03-22 PROCEDURE — 85652 RBC SED RATE AUTOMATED: CPT

## 2024-03-22 PROCEDURE — 85025 COMPLETE CBC W/AUTO DIFF WBC: CPT

## 2024-03-22 PROCEDURE — 82570 ASSAY OF URINE CREATININE: CPT

## 2024-03-22 PROCEDURE — 86140 C-REACTIVE PROTEIN: CPT

## 2024-03-22 PROCEDURE — 36415 COLL VENOUS BLD VENIPUNCTURE: CPT

## 2024-03-27 LAB — DSDNA AB QUANT (OHS): 0.8 IU/ML

## 2024-06-18 ENCOUNTER — TELEPHONE (OUTPATIENT)
Dept: RHEUMATOLOGY | Facility: CLINIC | Age: 59
End: 2024-06-18
Payer: COMMERCIAL

## 2024-06-18 DIAGNOSIS — Z79.899 LONG-TERM USE OF PLAQUENIL: ICD-10-CM

## 2024-06-18 DIAGNOSIS — Z79.899 HIGH RISK MEDICATION USE: ICD-10-CM

## 2024-06-18 DIAGNOSIS — M34.1 CREST SYNDROME: ICD-10-CM

## 2024-06-18 DIAGNOSIS — M35.00 SJOGREN'S SYNDROME, WITH UNSPECIFIED ORGAN INVOLVEMENT: ICD-10-CM

## 2024-06-18 DIAGNOSIS — M47.26 OSTEOARTHRITIS OF SPINE WITH RADICULOPATHY, LUMBAR REGION: Chronic | ICD-10-CM

## 2024-06-18 DIAGNOSIS — M34.1 CREST SYNDROME: Primary | ICD-10-CM

## 2024-06-18 DIAGNOSIS — M35.9 CONNECTIVE TISSUE DISEASE, UNDIFFERENTIATED: ICD-10-CM

## 2024-06-18 DIAGNOSIS — Z51.81 MEDICATION MONITORING ENCOUNTER: ICD-10-CM

## 2024-06-18 DIAGNOSIS — I73.00 RAYNAUD'S DISEASE WITHOUT GANGRENE: ICD-10-CM

## 2024-06-18 DIAGNOSIS — R76.8 POSITIVE ANA (ANTINUCLEAR ANTIBODY): ICD-10-CM

## 2024-06-18 RX ORDER — HYDROXYCHLOROQUINE SULFATE 200 MG/1
400 TABLET, FILM COATED ORAL DAILY
Qty: 180 TABLET | Refills: 0 | Status: SHIPPED | OUTPATIENT
Start: 2024-06-18

## 2024-07-05 ENCOUNTER — LAB VISIT (OUTPATIENT)
Dept: LAB | Facility: HOSPITAL | Age: 59
End: 2024-07-05
Attending: STUDENT IN AN ORGANIZED HEALTH CARE EDUCATION/TRAINING PROGRAM
Payer: COMMERCIAL

## 2024-07-05 DIAGNOSIS — Z79.899 LONG-TERM USE OF PLAQUENIL: ICD-10-CM

## 2024-07-05 DIAGNOSIS — R76.8 POSITIVE ANA (ANTINUCLEAR ANTIBODY): ICD-10-CM

## 2024-07-05 DIAGNOSIS — M35.00 SJOGREN'S SYNDROME, WITH UNSPECIFIED ORGAN INVOLVEMENT: ICD-10-CM

## 2024-07-05 DIAGNOSIS — I73.00 RAYNAUD'S DISEASE WITHOUT GANGRENE: ICD-10-CM

## 2024-07-05 DIAGNOSIS — Z51.81 MEDICATION MONITORING ENCOUNTER: ICD-10-CM

## 2024-07-05 DIAGNOSIS — M35.9 CONNECTIVE TISSUE DISEASE, UNDIFFERENTIATED: ICD-10-CM

## 2024-07-05 DIAGNOSIS — Z79.899 HIGH RISK MEDICATION USE: ICD-10-CM

## 2024-07-05 DIAGNOSIS — M47.26 OSTEOARTHRITIS OF SPINE WITH RADICULOPATHY, LUMBAR REGION: ICD-10-CM

## 2024-07-05 DIAGNOSIS — M34.1 CREST SYNDROME: ICD-10-CM

## 2024-07-05 LAB
ALBUMIN SERPL-MCNC: 4.3 G/DL (ref 3.5–5)
ALBUMIN/GLOB SERPL: 1.5 RATIO (ref 1.1–2)
ALP SERPL-CCNC: 90 UNIT/L (ref 40–150)
ALT SERPL-CCNC: 14 UNIT/L (ref 0–55)
ANION GAP SERPL CALC-SCNC: 9 MEQ/L
AST SERPL-CCNC: 22 UNIT/L (ref 5–34)
BACTERIA #/AREA URNS AUTO: NORMAL /HPF
BASOPHILS # BLD AUTO: 0.03 X10(3)/MCL
BASOPHILS NFR BLD AUTO: 0.8 %
BILIRUB SERPL-MCNC: 0.5 MG/DL
BILIRUB UR QL STRIP.AUTO: NEGATIVE
BUN SERPL-MCNC: 24.6 MG/DL (ref 9.8–20.1)
C3 SERPL-MCNC: 104 MG/DL (ref 80–173)
C4 SERPL-MCNC: 33 MG/DL (ref 13–46)
CALCIUM SERPL-MCNC: 9.5 MG/DL (ref 8.4–10.2)
CHLORIDE SERPL-SCNC: 106 MMOL/L (ref 98–107)
CLARITY UR: CLEAR
CO2 SERPL-SCNC: 23 MMOL/L (ref 22–29)
COLOR UR AUTO: COLORLESS
CREAT SERPL-MCNC: 0.86 MG/DL (ref 0.55–1.02)
CREAT UR-MCNC: 27 MG/DL (ref 45–106)
CREAT/UREA NIT SERPL: 29
CRP SERPL-MCNC: 1.4 MG/L
EOSINOPHIL # BLD AUTO: 0.11 X10(3)/MCL (ref 0–0.9)
EOSINOPHIL NFR BLD AUTO: 3 %
ERYTHROCYTE [DISTWIDTH] IN BLOOD BY AUTOMATED COUNT: 13 % (ref 11.5–17)
ERYTHROCYTE [SEDIMENTATION RATE] IN BLOOD: 2 MM/HR (ref 0–20)
GFR SERPLBLD CREATININE-BSD FMLA CKD-EPI: >60 ML/MIN/1.73/M2
GLOBULIN SER-MCNC: 2.9 GM/DL (ref 2.4–3.5)
GLUCOSE SERPL-MCNC: 84 MG/DL (ref 74–100)
GLUCOSE UR QL STRIP: NORMAL
HCT VFR BLD AUTO: 34.4 % (ref 37–47)
HGB BLD-MCNC: 11.6 G/DL (ref 12–16)
HGB UR QL STRIP: NEGATIVE
IMM GRANULOCYTES # BLD AUTO: 0 X10(3)/MCL (ref 0–0.04)
IMM GRANULOCYTES NFR BLD AUTO: 0 %
KETONES UR QL STRIP: NEGATIVE
LEUKOCYTE ESTERASE UR QL STRIP: NEGATIVE
LYMPHOCYTES # BLD AUTO: 1.74 X10(3)/MCL (ref 0.6–4.6)
LYMPHOCYTES NFR BLD AUTO: 47.9 %
MCH RBC QN AUTO: 28.6 PG (ref 27–31)
MCHC RBC AUTO-ENTMCNC: 33.7 G/DL (ref 33–36)
MCV RBC AUTO: 84.7 FL (ref 80–94)
MONOCYTES # BLD AUTO: 0.47 X10(3)/MCL (ref 0.1–1.3)
MONOCYTES NFR BLD AUTO: 12.9 %
NEUTROPHILS # BLD AUTO: 1.28 X10(3)/MCL (ref 2.1–9.2)
NEUTROPHILS NFR BLD AUTO: 35.4 %
NITRITE UR QL STRIP: NEGATIVE
NRBC BLD AUTO-RTO: 0 %
PH UR STRIP: 5.5 [PH]
PLATELET # BLD AUTO: 146 X10(3)/MCL (ref 130–400)
PMV BLD AUTO: 10.8 FL (ref 7.4–10.4)
POTASSIUM SERPL-SCNC: 4.3 MMOL/L (ref 3.5–5.1)
PROT SERPL-MCNC: 7.2 GM/DL (ref 6.4–8.3)
PROT UR QL STRIP: NEGATIVE
PROT UR STRIP-MCNC: <6.8 MG/DL
RBC # BLD AUTO: 4.06 X10(6)/MCL (ref 4.2–5.4)
RBC #/AREA URNS AUTO: NORMAL /HPF
SODIUM SERPL-SCNC: 138 MMOL/L (ref 136–145)
SP GR UR STRIP.AUTO: 1.01 (ref 1–1.03)
SQUAMOUS #/AREA URNS LPF: NORMAL /HPF
UROBILINOGEN UR STRIP-ACNC: NORMAL
WBC # BLD AUTO: 3.63 X10(3)/MCL (ref 4.5–11.5)
WBC #/AREA URNS AUTO: NORMAL /HPF

## 2024-07-05 PROCEDURE — 85025 COMPLETE CBC W/AUTO DIFF WBC: CPT

## 2024-07-05 PROCEDURE — 86235 NUCLEAR ANTIGEN ANTIBODY: CPT

## 2024-07-05 PROCEDURE — 81001 URINALYSIS AUTO W/SCOPE: CPT

## 2024-07-05 PROCEDURE — 86140 C-REACTIVE PROTEIN: CPT

## 2024-07-05 PROCEDURE — 86160 COMPLEMENT ANTIGEN: CPT

## 2024-07-05 PROCEDURE — 86039 ANTINUCLEAR ANTIBODIES (ANA): CPT

## 2024-07-05 PROCEDURE — 80053 COMPREHEN METABOLIC PANEL: CPT

## 2024-07-05 PROCEDURE — 36415 COLL VENOUS BLD VENIPUNCTURE: CPT

## 2024-07-05 PROCEDURE — 86225 DNA ANTIBODY NATIVE: CPT

## 2024-07-05 PROCEDURE — 85652 RBC SED RATE AUTOMATED: CPT

## 2024-07-05 PROCEDURE — 82570 ASSAY OF URINE CREATININE: CPT

## 2024-07-08 NOTE — PROGRESS NOTES
No excess protein.  CBC and CMP stable.  Complement levels normal.  Inflammatory markers not elevated.

## 2024-07-09 LAB
ANTINUCLEAR ANTIBODY SCREEN (OHS): POSITIVE
DSDNA AB QUANT (OHS): 1.1 IU/ML

## 2024-07-11 LAB
CENTROMERE QUANT (OHS): >240 U/ML
JO-1 AB QUANT (OHS): <0.3 U/ML
RNP70 AB QUANT (OHS): 0.9 U/ML
SCL-70S AB QUANT (OHS): 0.7 U/ML
SMITH AB QUANT (OHS): <0.7 U/ML
SSA(RO) AB QUANT (OHS): <0.4 U/ML
SSB(LA) AB QUANT (OHS): <0.4 U/ML
U1RNP AB QUANT (OHS): 1.1 U/ML

## 2024-07-12 ENCOUNTER — OFFICE VISIT (OUTPATIENT)
Dept: RHEUMATOLOGY | Facility: CLINIC | Age: 59
End: 2024-07-12
Payer: COMMERCIAL

## 2024-07-12 VITALS
DIASTOLIC BLOOD PRESSURE: 72 MMHG | HEART RATE: 55 BPM | SYSTOLIC BLOOD PRESSURE: 112 MMHG | WEIGHT: 211 LBS | HEIGHT: 67 IN | BODY MASS INDEX: 33.12 KG/M2

## 2024-07-12 DIAGNOSIS — Z79.899 LONG-TERM USE OF PLAQUENIL: ICD-10-CM

## 2024-07-12 DIAGNOSIS — M34.1 CREST SYNDROME: Primary | ICD-10-CM

## 2024-07-12 PROCEDURE — 99999 PR PBB SHADOW E&M-EST. PATIENT-LVL IV: CPT | Mod: PBBFAC,,, | Performed by: STUDENT IN AN ORGANIZED HEALTH CARE EDUCATION/TRAINING PROGRAM

## 2024-07-12 PROCEDURE — 1160F RVW MEDS BY RX/DR IN RCRD: CPT | Mod: CPTII,S$GLB,, | Performed by: STUDENT IN AN ORGANIZED HEALTH CARE EDUCATION/TRAINING PROGRAM

## 2024-07-12 PROCEDURE — 3074F SYST BP LT 130 MM HG: CPT | Mod: CPTII,S$GLB,, | Performed by: STUDENT IN AN ORGANIZED HEALTH CARE EDUCATION/TRAINING PROGRAM

## 2024-07-12 PROCEDURE — 99214 OFFICE O/P EST MOD 30 MIN: CPT | Mod: S$GLB,,, | Performed by: STUDENT IN AN ORGANIZED HEALTH CARE EDUCATION/TRAINING PROGRAM

## 2024-07-12 PROCEDURE — 4010F ACE/ARB THERAPY RXD/TAKEN: CPT | Mod: CPTII,S$GLB,, | Performed by: STUDENT IN AN ORGANIZED HEALTH CARE EDUCATION/TRAINING PROGRAM

## 2024-07-12 PROCEDURE — 1159F MED LIST DOCD IN RCRD: CPT | Mod: CPTII,S$GLB,, | Performed by: STUDENT IN AN ORGANIZED HEALTH CARE EDUCATION/TRAINING PROGRAM

## 2024-07-12 PROCEDURE — 3008F BODY MASS INDEX DOCD: CPT | Mod: CPTII,S$GLB,, | Performed by: STUDENT IN AN ORGANIZED HEALTH CARE EDUCATION/TRAINING PROGRAM

## 2024-07-12 PROCEDURE — 3078F DIAST BP <80 MM HG: CPT | Mod: CPTII,S$GLB,, | Performed by: STUDENT IN AN ORGANIZED HEALTH CARE EDUCATION/TRAINING PROGRAM

## 2024-07-12 RX ORDER — TRIAMCINOLONE ACETONIDE 1 MG/G
CREAM TOPICAL
COMMUNITY
Start: 2024-05-03

## 2024-07-12 RX ORDER — LISINOPRIL 20 MG/1
TABLET ORAL
COMMUNITY
Start: 2023-11-22

## 2024-07-15 NOTE — PROGRESS NOTES
RHEUMATOLOGY CLINIC ESTABLISHED PATIENT VISIT    Reason for consult:- crest syndrome; positive EMILIANO    Chief complaints, HPI, ROS, EXAM, Assessment & Plans:-    Candie Gamboa is a 59 y.o. pleasant female who presents to follow-up from her previous visit November with Ana for management of undifferentiated connective tissue disease/crest syndrome.  Currently doing well on Plaquenil 400 mg daily.  Following with Cardiology and pulmonology for her pulmonary hypertension.  Breathing stable.  Raynaud's doing well.  No digital ulcerations.  Rheumatologic review of systems otherwise negative.  Physical exam is unremarkable.  No synovitis, dactylitis or enthesitis.  No rashes noted.  No digital ulcerations.    Reviewed all available old and outside pertinent medical records.    All lab results personally reviewed and interpreted by me.    1. CREST syndrome    2. Long-term use of Plaquenil        Problem List Items Addressed This Visit    None  Visit Diagnoses       CREST syndrome    -  Primary    Relevant Orders    Anti-DNA Ab, Double-Stranded    C3 Complement    C4 Complement    CBC Auto Differential    Comprehensive Metabolic Panel    Urinalysis    Sedimentation rate    Protein/Creatinine Ratio, Urine    C-Reactive Protein    Long-term use of Plaquenil        Relevant Orders    Anti-DNA Ab, Double-Stranded    C3 Complement    C4 Complement    CBC Auto Differential    Comprehensive Metabolic Panel    Urinalysis    Sedimentation rate    Protein/Creatinine Ratio, Urine    C-Reactive Protein            Patient following up today for management of UCTD with overlapping crest syndrome  Currently doing well on Plaquenil 400 mg daily  Pulmonary hypertension treated by pulmonology and Cardiology and stable  Raynaud's doing well on nifedipine and Revatio  Lupus monitoring labs updated    # Follow up in about 6 months (around 1/12/2025).    Chronic comorbid conditions affecting medical decision making today:    Past Medical  History:   Diagnosis Date    *Atrial fibrillation     Acid reflux     Allergy     Anemia     Anxiety     Dry eyes     Dry mouth     Hyperlipidemia     Hypertension     Sickle cell anemia        Past Surgical History:   Procedure Laterality Date     SECTION      CYST REMOVAL      RIGHT HEART CATHETERIZATION      TUBAL LIGATION          Social History     Tobacco Use    Smoking status: Never   Substance Use Topics    Alcohol use: No    Drug use: No       Family History   Problem Relation Name Age of Onset    Hypertension Mother      Rheum arthritis Mother      Cancer Father      Hypertension Sister      Hyperlipidemia Brother         Review of patient's allergies indicates:  No Known Allergies    Medication List with Changes/Refills   Current Medications    AMLODIPINE (NORVASC) 10 MG TABLET    Take 1 tablet by mouth once daily.    ASCORBIC ACID, VITAMIN C, (VITAMIN C) 1000 MG TABLET    Take 1,000 mg by mouth 2 (two) times daily.    ASPIRIN (ECOTRIN) 81 MG EC TABLET    1 tablet.    DOCOSAHEXANOIC ACID/EPA (FISH OIL ORAL)    Take 2,000 mg by mouth 2 (two) times daily.    ESOMEPRAZOLE (NEXIUM) 40 MG CAPSULE    TAKE ONE CAPSULE BY MOUTH BEFORE BREAKFAST    FEXOFENADINE (ALLEGRA) 180 MG TABLET    1 tablet as needed.    FLUTICASONE (FLONASE) 50 MCG/ACTUATION NASAL SPRAY    1 spray by Each Nare route once daily.    HYDROXYCHLOROQUINE (PLAQUENIL) 200 MG TABLET    Take 2 tablets (400 mg total) by mouth once daily.    LISINOPRIL (PRINIVIL,ZESTRIL) 20 MG TABLET        MACITENTAN (OPSUMIT) 10 MG TAB    1 tablet.    MULTIVITAMIN (THERAGRAN) PER TABLET    Take 1 tablet by mouth once daily.    PRAVASTATIN (PRAVACHOL) 40 MG TABLET    Take 40 mg by mouth once daily.    PYRIDOXINE, VITAMIN B6, 200 MG TAB    Take by mouth.    SILDENAFIL (REVATIO) 20 MG TAB    Take 1 tablet (20 mg total) by mouth three times daily.    SOTALOL (BETAPACE) 80 MG TABLET    1 tablet.    TRIAMCINOLONE ACETONIDE 0.1% (KENALOG) 0.1 % CREAM     SMARTSIG:Sparingly Topical Twice Daily    VITAMIN E 400 UNIT CAPSULE    Take 400 Units by mouth once daily.    ZINC GLUCONATE 100 MG TAB    1 tablet.         Disclaimer: This note was prepared using voice recognition system and is likely to have sound alike errors and is not proofread.  Please message me with any questions.    32 minutes of total time spent on the encounter, which includes face to face time and non-face to face time preparing to see the patient (eg, review of tests), Obtaining and/or reviewing separately obtained history, Documenting clinical information in the electronic or other health record, Independently interpreting results (not separately reported) and communicating results to the patient/family/caregiver, or Care coordination (not separately reported).     Thank you for allowing me to participate in the care of Candie Gamboa.    Isaiah Valverde MD

## 2024-09-30 DIAGNOSIS — M35.9 CONNECTIVE TISSUE DISEASE, UNDIFFERENTIATED: ICD-10-CM

## 2024-09-30 DIAGNOSIS — M34.1 CREST SYNDROME: ICD-10-CM

## 2024-09-30 RX ORDER — HYDROXYCHLOROQUINE SULFATE 200 MG/1
400 TABLET, FILM COATED ORAL DAILY
Qty: 180 TABLET | Refills: 0 | Status: SHIPPED | OUTPATIENT
Start: 2024-09-30

## 2025-01-06 DIAGNOSIS — M35.9 CONNECTIVE TISSUE DISEASE, UNDIFFERENTIATED: ICD-10-CM

## 2025-01-06 DIAGNOSIS — M34.1 CREST SYNDROME: ICD-10-CM

## 2025-01-06 RX ORDER — HYDROXYCHLOROQUINE SULFATE 200 MG/1
400 TABLET, FILM COATED ORAL DAILY
Qty: 180 TABLET | Refills: 0 | Status: SHIPPED | OUTPATIENT
Start: 2025-01-06

## 2025-04-10 ENCOUNTER — LAB VISIT (OUTPATIENT)
Dept: LAB | Facility: HOSPITAL | Age: 60
End: 2025-04-10
Attending: INTERNAL MEDICINE
Payer: COMMERCIAL

## 2025-04-10 ENCOUNTER — OFFICE VISIT (OUTPATIENT)
Dept: RHEUMATOLOGY | Facility: CLINIC | Age: 60
End: 2025-04-10
Payer: COMMERCIAL

## 2025-04-10 VITALS
HEART RATE: 56 BPM | DIASTOLIC BLOOD PRESSURE: 77 MMHG | HEIGHT: 67 IN | BODY MASS INDEX: 34.33 KG/M2 | SYSTOLIC BLOOD PRESSURE: 144 MMHG | WEIGHT: 218.69 LBS

## 2025-04-10 DIAGNOSIS — M34.1 CREST SYNDROME: ICD-10-CM

## 2025-04-10 DIAGNOSIS — M35.9 CONNECTIVE TISSUE DISEASE, UNDIFFERENTIATED: ICD-10-CM

## 2025-04-10 DIAGNOSIS — M34.1 CREST SYNDROME: Primary | ICD-10-CM

## 2025-04-10 DIAGNOSIS — Z51.81 MEDICATION MONITORING ENCOUNTER: ICD-10-CM

## 2025-04-10 DIAGNOSIS — K21.9 GASTROESOPHAGEAL REFLUX DISEASE WITHOUT ESOPHAGITIS: ICD-10-CM

## 2025-04-10 DIAGNOSIS — I27.21 PULMONARY ARTERIAL HYPERTENSION: ICD-10-CM

## 2025-04-10 DIAGNOSIS — Z79.899 LONG-TERM USE OF PLAQUENIL: ICD-10-CM

## 2025-04-10 DIAGNOSIS — E66.01 OBESITY, MORBID, BMI 40.0-49.9: ICD-10-CM

## 2025-04-10 DIAGNOSIS — M47.26 OSTEOARTHRITIS OF SPINE WITH RADICULOPATHY, LUMBAR REGION: ICD-10-CM

## 2025-04-10 LAB
ABSOLUTE EOSINOPHIL (OHS): 0.11 K/UL
ABSOLUTE MONOCYTE (OHS): 0.33 K/UL (ref 0.3–1)
ABSOLUTE NEUTROPHIL COUNT (OHS): 1.1 K/UL (ref 1.8–7.7)
ALBUMIN SERPL BCP-MCNC: 4.3 G/DL (ref 3.5–5.2)
ALP SERPL-CCNC: 92 UNIT/L (ref 40–150)
ALT SERPL W/O P-5'-P-CCNC: 22 UNIT/L (ref 10–44)
ANION GAP (OHS): 8 MMOL/L (ref 8–16)
AST SERPL-CCNC: 24 UNIT/L (ref 11–45)
BASOPHILS # BLD AUTO: 0.03 K/UL
BASOPHILS NFR BLD AUTO: 1.1 %
BILIRUB SERPL-MCNC: 0.5 MG/DL (ref 0.1–1)
BUN SERPL-MCNC: 26 MG/DL (ref 6–20)
CALCIUM SERPL-MCNC: 8.9 MG/DL (ref 8.7–10.5)
CHLORIDE SERPL-SCNC: 108 MMOL/L (ref 95–110)
CO2 SERPL-SCNC: 24 MMOL/L (ref 23–29)
CREAT SERPL-MCNC: 0.9 MG/DL (ref 0.5–1.4)
CRP SERPL-MCNC: 1.1 MG/L
ERYTHROCYTE [DISTWIDTH] IN BLOOD BY AUTOMATED COUNT: 13.2 % (ref 11.5–14.5)
ERYTHROCYTE [SEDIMENTATION RATE] IN BLOOD: <2 MM/HR
GFR SERPLBLD CREATININE-BSD FMLA CKD-EPI: >60 ML/MIN/1.73/M2
GLUCOSE SERPL-MCNC: 86 MG/DL (ref 70–110)
HCT VFR BLD AUTO: 35.6 % (ref 37–48.5)
HGB BLD-MCNC: 12.1 GM/DL (ref 12–16)
IMM GRANULOCYTES # BLD AUTO: 0.01 K/UL (ref 0–0.04)
IMM GRANULOCYTES NFR BLD AUTO: 0.4 % (ref 0–0.5)
LYMPHOCYTES # BLD AUTO: 1.07 K/UL (ref 1–4.8)
MCH RBC QN AUTO: 28.3 PG (ref 27–31)
MCHC RBC AUTO-ENTMCNC: 34 G/DL (ref 32–36)
MCV RBC AUTO: 83 FL (ref 82–98)
NUCLEATED RBC (/100WBC) (OHS): 0 /100 WBC
PLATELET # BLD AUTO: 137 K/UL (ref 150–450)
PMV BLD AUTO: 10.4 FL (ref 9.2–12.9)
POTASSIUM SERPL-SCNC: 4.6 MMOL/L (ref 3.5–5.1)
PROT SERPL-MCNC: 7.6 GM/DL (ref 6–8.4)
RBC # BLD AUTO: 4.27 M/UL (ref 4–5.4)
RELATIVE EOSINOPHIL (OHS): 4.2 %
RELATIVE LYMPHOCYTE (OHS): 40.4 % (ref 18–48)
RELATIVE MONOCYTE (OHS): 12.5 % (ref 4–15)
RELATIVE NEUTROPHIL (OHS): 41.4 % (ref 38–73)
SODIUM SERPL-SCNC: 140 MMOL/L (ref 136–145)
WBC # BLD AUTO: 2.65 K/UL (ref 3.9–12.7)

## 2025-04-10 PROCEDURE — 1159F MED LIST DOCD IN RCRD: CPT | Mod: CPTII,S$GLB,, | Performed by: INTERNAL MEDICINE

## 2025-04-10 PROCEDURE — 3008F BODY MASS INDEX DOCD: CPT | Mod: CPTII,S$GLB,, | Performed by: INTERNAL MEDICINE

## 2025-04-10 PROCEDURE — 82565 ASSAY OF CREATININE: CPT

## 2025-04-10 PROCEDURE — 83880 ASSAY OF NATRIURETIC PEPTIDE: CPT

## 2025-04-10 PROCEDURE — 86334 IMMUNOFIX E-PHORESIS SERUM: CPT

## 2025-04-10 PROCEDURE — 84165 PROTEIN E-PHORESIS SERUM: CPT

## 2025-04-10 PROCEDURE — 99215 OFFICE O/P EST HI 40 MIN: CPT | Mod: S$GLB,,, | Performed by: INTERNAL MEDICINE

## 2025-04-10 PROCEDURE — 3078F DIAST BP <80 MM HG: CPT | Mod: CPTII,S$GLB,, | Performed by: INTERNAL MEDICINE

## 2025-04-10 PROCEDURE — 85025 COMPLETE CBC W/AUTO DIFF WBC: CPT

## 2025-04-10 PROCEDURE — 99999 PR PBB SHADOW E&M-EST. PATIENT-LVL V: CPT | Mod: PBBFAC,,, | Performed by: INTERNAL MEDICINE

## 2025-04-10 PROCEDURE — 4010F ACE/ARB THERAPY RXD/TAKEN: CPT | Mod: CPTII,S$GLB,, | Performed by: INTERNAL MEDICINE

## 2025-04-10 PROCEDURE — 86140 C-REACTIVE PROTEIN: CPT

## 2025-04-10 PROCEDURE — 85652 RBC SED RATE AUTOMATED: CPT

## 2025-04-10 PROCEDURE — G2211 COMPLEX E/M VISIT ADD ON: HCPCS | Mod: S$GLB,,, | Performed by: INTERNAL MEDICINE

## 2025-04-10 PROCEDURE — 84165 PROTEIN E-PHORESIS SERUM: CPT | Mod: ,,, | Performed by: PATHOLOGY

## 2025-04-10 PROCEDURE — 36415 COLL VENOUS BLD VENIPUNCTURE: CPT

## 2025-04-10 PROCEDURE — 3077F SYST BP >= 140 MM HG: CPT | Mod: CPTII,S$GLB,, | Performed by: INTERNAL MEDICINE

## 2025-04-10 RX ORDER — HYDROXYCHLOROQUINE SULFATE 200 MG/1
400 TABLET, FILM COATED ORAL DAILY
Qty: 180 TABLET | Refills: 3 | Status: SHIPPED | OUTPATIENT
Start: 2025-04-10

## 2025-04-10 NOTE — PROGRESS NOTES
RHEUMATOLOGY CLINIC FOLLOW UP VISIT  Chief complaints, HPI, ROS, EXAM, Assessment & Plans:-  Candie GARCIA Spikealayna a 59 y.o. pleasant female comes in for follow-up visit.  She has been under care of my associates.  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  Reports doing well today.  No active Raynaud's.  No worsening shortness of breath.  No reduced exercise tolerance.  No orthopnea.  No PND.  Mild edema around lower extremities.  Stable gastroesophageal reflux disease on Nexium.  Mild activity-related lower back pain.  No swelling of small joints.  Rheumatological review of system negative otherwise.  Physical examination shows no synovitis of small joints.  No sclerodactyly.  No telangiectasia.  No active Raynaud's.  No capillary dropout.  No dactylitis.  Mild pitting edema lower extremities..    1. CREST syndrome    2. Connective tissue disease, undifferentiated    3. Long-term use of Plaquenil    4. Medication monitoring encounter    5. Osteoarthritis of spine with radiculopathy, lumbar region    6. Pulmonary arterial hypertension    7. Obesity, morbid, BMI 40.0-49.9    8. Gastroesophageal reflux disease without esophagitis      Problem List Items Addressed This Visit       Degenerative arthritis of lumbar spine (Chronic)    Obesity, morbid, BMI 40.0-49.9 (Chronic)    Connective tissue disease, undifferentiated    Relevant Medications    hydroxychloroquine (PLAQUENIL) 200 mg tablet    Gastroesophageal reflux disease without esophagitis    Medication monitoring encounter    Relevant Orders    CBC Auto Differential    Comprehensive Metabolic Panel    Pulmonary arterial hypertension    Relevant Orders    CBC Auto Differential    Comprehensive Metabolic Panel    NT-Pro Natriuretic Peptide     Other Visit Diagnoses         CREST syndrome    -  Primary    Relevant Medications     hydroxychloroquine (PLAQUENIL) 200 mg tablet    Other Relevant Orders    Protein Electrophoresis, Serum    Immunofixation, Serum    CBC Auto Differential    Comprehensive Metabolic Panel    C-Reactive Protein    Sedimentation rate    NT-Pro Natriuretic Peptide      Long-term use of Plaquenil        Relevant Orders    CBC Auto Differential    Comprehensive Metabolic Panel            Labs reviewed today:-   Latest Reference Range & Units 04/10/25 09:38   WBC 3.90 - 12.70 K/uL 2.65 (L)   RBC 4.00 - 5.40 M/uL 4.27   Hemoglobin 12.0 - 16.0 gm/dL 12.1   Hematocrit 37.0 - 48.5 % 35.6 (L)   MCV 82 - 98 fL 83   MCH 27.0 - 31.0 pg 28.3   MCHC 32.0 - 36.0 g/dL 34.0   RDW 11.5 - 14.5 % 13.2   Platelet Count 150 - 450 K/uL 137 (L)   MPV 9.2 - 12.9 fL 10.4   Neut % 38 - 73 % 41.4   Lymph % 18 - 48 % 40.4   Mono % 4 - 15 % 12.5   Eos % <=8 % 4.2   Basophil % <=1.9 % 1.1   Immature Granulocytes 0.0 - 0.5 % 0.4   Gran # (ANC) 1.8 - 7.7 K/uL 1.10 (L)   Lymph # 1 - 4.8 K/uL 1.07   Mono # 0.3 - 1 K/uL 0.33   Eos # <=0.5 K/uL 0.11   Baso # <=0.2 K/uL 0.03   Immature Grans (Abs) 0.00 - 0.04 K/uL 0.01   nRBC <=0 /100 WBC 0   Sed Rate <=36 mm/hr <2   Sodium 136 - 145 mmol/L 140   Potassium 3.5 - 5.1 mmol/L 4.6   Chloride 95 - 110 mmol/L 108   CO2 23 - 29 mmol/L 24   Anion Gap 8 - 16 mmol/L 8   BUN 6 - 20 mg/dL 26 (H)   Creatinine 0.5 - 1.4 mg/dL 0.9   eGFR >60 mL/min/1.73/m2 >60   Glucose 70 - 110 mg/dL 86   Calcium 8.7 - 10.5 mg/dL 8.9   ALP 40 - 150 unit/L 92   PROTEIN TOTAL 6.0 - 8.4 gm/dL 7.6   Albumin 3.5 - 5.2 g/dL 4.3   BILIRUBIN TOTAL 0.1 - 1.0 mg/dL 0.5   AST 11 - 45 unit/L 24   ALT 10 - 44 unit/L 22   CRP <=8.2 mg/L 1.1   (L): Data is abnormally low  (H): Data is abnormally high     Latest Reference Range & Units Most Recent   EMILIANO Negative  Positive !  7/5/24 15:37   EMILIANO Screen Negative <1:80  Positive !  1/6/23 15:37   EMILIANO HEP-2 Titer  Positive 1:320 Centromere  1/15/16 09:00   EMILIANO Titer 1  >=1:2560  1/6/23 15:37   EMILIANO PATTERN 1   Centromere  1/6/23 15:37   ds DNA Ab <10.0 IU/mL 1.1  7/5/24 15:37   Anti-SSA Antibody <7 U/mL <0.4  7/5/24 15:37   Anti-SSA Interpretation Negative  Negative  1/6/23 15:37   Anti-SSB Antibody <7 U/mL <0.4  7/5/24 15:37   Anti-SSB Interpretation Negative  Negative  1/6/23 15:37   Anti Sm Antibody 0.00 - 0.99 Ratio 0.09  1/6/23 15:37   Anti-Sm Interpretation Negative  Negative  1/6/23 15:37   Anti Sm/RNP Antibody 0.00 - 0.99 Ratio 0.11  1/6/23 15:37   Anti-Sm/RNP Interpretation Negative  Negative  1/6/23 15:37   EMILIANO Hep-2 Pattern  Centromere !  5/25/12 08:39   PATRICIA-1 Ab Quant <7 U/mL <0.3  7/5/24 15:37   RNP70 Ab Quant <7 U/mL 0.9  7/5/24 15:37   U1RNP Ab Quant <5 U/mL 1.1  7/5/24 15:37   SCL-70 Antibody <7 U/mL 0.7  7/5/24 15:37   Fermin DP IgG Quant <7 U/mL <0.7  7/5/24 15:37   Centromere Ab Quant <7 U/mL >240 (H)  7/5/24 15:37   Scleroderma SCL- <20 UNITS 2  1/6/23 15:37   C3 Complement 80 - 173 mg/dL 104  7/5/24 15:37   Complement (C-3) 50 - 180 mg/dL 114  9/10/15 10:15   C4 Complement 13.0 - 46.0 mg/dL 33.0  7/5/24 15:37   Complement (C-4) 11 - 44 mg/dL 32  9/10/15 10:15   Protein, Serum 6.0 - 8.0 gm/dl 7.4  11/24/04 08:19   Albumin grams/dl 3.50 - 5.5 gm/dl 4.28  11/24/04 08:19   Alpha-1 grams/dl 0.11 - 0.32 gm/dl 0.16  11/24/04 08:19   Alpha-2 0.50 - 0.95 gm/dl 0.49 (L)  11/24/04 08:19   Beta 0.58 - 1.1 gm/dl 1.15 (H)  11/24/04 08:19   Gamma 0.50 - 1.50 gm/dl 1.32  11/24/04 08:19   Immunofix Interp.  SEE COMMENT FOR INTERPRETATION !  11/24/04 08:19   SPE Interp.  SEE COMMENT FOR INTERPRETATION !  11/24/04 08:19   !: Data is abnormal  (H): Data is abnormally high  (L): Data is abnormally low      Centromere antibody positive scleroderma with pulmonary artery hypertension , Raynaud's and gastroesophageal reflux disease well controlled on Plaquenil, opsumit, sildenafil and proton pump inhibitors.  Repeat inflammatory markers and safety labs today.  Chronic stable leukopenia.  Ordered flow cytometry next  visit.  Continue Plaquenil with eye checkup every six months.  On Plaquenil for more than fifteen years..  Consider reducing dose of Plaquenil next visit to once daily.  Continue follow-up with cardiologist and pulmonologist.  I have explained all of the above in detail and the patient understands all of the current recommendation(s). I have answered all questions to the best of my ability and to their complete satisfaction.       # Follow up in about 6 months (around 10/10/2025).      Disclaimer: This note was prepared using voice recognition system and is likely to have sound alike errors and is not proof read.  Please call me with any questions.      Total time spent 45 minutes. This includes face to face time and non-face to face time preparing to see the patient (eg, review of tests), obtaining and/or reviewing separately obtained history, documenting clinical information in the electronic or other health record, independently interpreting results and communicating results to the patient/family/caregiver, or care coordinator.

## 2025-04-11 LAB
ALBUMIN, SPE (OHS): 4.29 G/DL (ref 3.35–5.55)
ALPHA 1 GLOB (OHS): 0.27 GM/DL (ref 0.17–0.41)
ALPHA 2 GLOB (OHS): 0.5 GM/DL (ref 0.43–0.99)
BETA GLOB (OHS): 0.67 GM/DL (ref 0.5–1.1)
GAMMA GLOBULIN (OHS): 1.17 GM/DL (ref 0.67–1.58)
PATHOLOGIST INTERPRETATION - IFE SERUM (OHS): NORMAL
PATHOLOGIST REVIEW - SPE (OHS): NORMAL
PROT SERPL-MCNC: 6.9 GM/DL (ref 6–8.4)

## 2025-04-12 LAB — NT-PROBNP SERPL IA-MCNC: 224 PG/ML

## 2025-04-13 ENCOUNTER — RESULTS FOLLOW-UP (OUTPATIENT)
Dept: RHEUMATOLOGY | Facility: CLINIC | Age: 60
End: 2025-04-13